# Patient Record
Sex: FEMALE | Race: WHITE | NOT HISPANIC OR LATINO | URBAN - METROPOLITAN AREA
[De-identification: names, ages, dates, MRNs, and addresses within clinical notes are randomized per-mention and may not be internally consistent; named-entity substitution may affect disease eponyms.]

---

## 2017-03-28 ENCOUNTER — CLINICAL SUPPORT (OUTPATIENT)
Dept: REHABILITATION | Facility: HOSPITAL | Age: 72
End: 2017-03-28
Attending: INTERNAL MEDICINE
Payer: COMMERCIAL

## 2017-03-28 DIAGNOSIS — M25.551 BILATERAL HIP PAIN: ICD-10-CM

## 2017-03-28 DIAGNOSIS — M79.671 INTRACTABLE RIGHT HEEL PAIN: Primary | ICD-10-CM

## 2017-03-28 DIAGNOSIS — M25.552 BILATERAL HIP PAIN: ICD-10-CM

## 2017-03-28 PROCEDURE — G8978 MOBILITY CURRENT STATUS: HCPCS | Mod: CL,PO

## 2017-03-28 PROCEDURE — 97162 PT EVAL MOD COMPLEX 30 MIN: CPT | Mod: PO

## 2017-03-28 PROCEDURE — G8979 MOBILITY GOAL STATUS: HCPCS | Mod: CK,PO

## 2017-03-28 NOTE — PROGRESS NOTES
Physical Therapy Evaluation    Visit: 1    Name: Keiry Rudolph  Clinic Number: [id number]    Keiry is a 71 y.o. female evaluated on 03/28/2017.     Diagnosis:   Encounter Diagnoses   Name Primary?    Intractable right heel pain Yes    Bilateral hip pain        DOS: NA    Physician: Josemanuel Duggan MD  Treatment Orders: PT Eval and Treat    No past medical history on file.  No current outpatient prescriptions on file.     No current facility-administered medications for this visit.      Review of patient's allergies indicates:  Allergies not on file  Precautions: Fall  Occupation: retired      Subjective  Onset/JIMMY: gradual  Involved Area/Location: bilateral  Current Symptoms: pain    Increases symptoms: Movement  Decreases Symptoms: Not moving    Current Function: Prolonged walking, standing and climbing stairs  Previous function: I in ADL's    Diagnostic Tests: MRI    Pain Scale: Keiry rates B leg pain to be between 4 - 8 on a scale of 1-10.    Patient Goals: decrease pain and Improve mobility and walk without pain      Objective    Functional Limitations  Reports - G Codes    Category:  Mobility   Tool:  FOTO Outcomes Measurement System.   Score:  Patient scored out 24 of 100 on the FOTO Outcomes Measurement System.   Current:  G 8978 CL   Goal:  G 8979 CK          Observation: Pt was not willing flex her L hip in supine    Posture: Flexed trunk in standing       Passive Range of Motion: Knee   Left Right   Flexion: NT NT   Extension 0 0            Lower Extremity Strength  Right LE  Left LE    Knee extension: 3+/5 Knee extension: 3/5   Knee flexion: 2/5 Knee flexion: 2/5   Hip flexion: 2/5 Hip flexion: 2/5   Hip extension:  NT Hip extension: NT   Hip abduction/Glut Medius: NT Hip abduction/Glut medius NT   Hip adduction: NT Hip adduction: NT   Hip internal rotation: NT Hip internal rotation: NT   Hip external rotation: NT Hip  external rotation: NT   Ankle dorsiflexion: 5/5 Ankle dorsiflexion: 4/5   Ankle plantarflexion: NT Ankle plantarflexion: NT       Special Tests: Knee - NA    Joint Mobility: WNL  Palpation: B hip Adductors and hip flexors  Sensation: intact to light touch    Edema: none      Gait: Keiry ambulated 100 feet with no assistive device.    Balance: NT    PT Evaluation Completed? Yes  Discussed Plan of Care with patient: Yes    Treatment:  Pt performed there ex's for B LE strengthening, ROM, flexibility and endurance including:  Quad sets  Gluteal sets  B Ankle Dorsiflexion, Plantar flexion, inversion and eversion    Exercises were reviewed and pt was able to demonstrate them prior to the end of the session.       Ed pt to use ice PRN 10- 20 min when pain or swelling occurs.     Keiry demo good understanding of the education provided. Patient demo good return demo of skill of exercises.      Assessment  This is a 71 y.o. female referred to outpatient physical therapy and presents with a medical diagnosis of   Encounter Diagnoses   Name Primary?    Intractable right heel pain Yes    Bilateral hip pain     and demonstrates limitations as described in the problem list. Pt will benefit from physcial therapy services in order to maximize pain free and/or functional use of bilateral knees and hips. The following goals were discussed with the patient and patient is in agreement with them as to be addressed in the treatment plan.     Problem List: pain, decreased strength, decreased balance and stability and decreased ability to fully participate in recreational/sports related activities.      GOALS: Short Term Goals:  3 weeks  1. Pt will demonstrate increased knee flexion AROM to 120 degrees.  2. Pt will demonstrate increased hip flexion AROM to 90 degrees  3. Independent with HEP to increase patient's tolerance for ADL  function    Long Term Goals: 6 weeks  1.Report decreased    B LE    pain  <   / =  2  /10 with walking to  increase tolerance for ADL's  2.Increased MMT  for  B LE's  to increase tolerance for ADL and work activities.  3.Increased arom  for  B hips and knees to improve normal movement patterns during ADL's.  4. Pt will report improvement in overall functional abilities of LE, evidenced by improved score on the FOTO knee survey    Plan  Pt will be treated by physical therapy 2 times a week for 6 weeks for Pt Education, HEP, therapeutic exercises, neuromuscular re-education/ balance exercises, joint mobilizations, modalities prn   to achieve established goals. Keiry may at times be seen by a PTA as part of the Rehab Team.     Cont PT for  6         weeks.   I certify the need for these services furnished under this plan of treatment and while under my care.______________________________ Physician/Referring Practitioner  Date of Signature

## 2017-03-30 ENCOUNTER — CLINICAL SUPPORT (OUTPATIENT)
Dept: REHABILITATION | Facility: HOSPITAL | Age: 72
End: 2017-03-30
Attending: INTERNAL MEDICINE
Payer: COMMERCIAL

## 2017-03-30 DIAGNOSIS — M25.552 BILATERAL HIP PAIN: Primary | ICD-10-CM

## 2017-03-30 DIAGNOSIS — M25.551 BILATERAL HIP PAIN: Primary | ICD-10-CM

## 2017-03-30 PROCEDURE — 97110 THERAPEUTIC EXERCISES: CPT | Mod: PO

## 2017-03-30 PROCEDURE — 97530 THERAPEUTIC ACTIVITIES: CPT | Mod: PO

## 2017-03-30 NOTE — PROGRESS NOTES
Physical Therapy Daily Note    Visit: 2    Name: Keiry Rudolph  Clinic Number: [id number]    Keiry is a 71 y.o. female treated on 03/30/2017.     Diagnosis:   Encounter Diagnosis   Name Primary?    Bilateral hip pain Yes       DOS: NA    Physician: Kirk, Provider  Treatment Orders: PT Eval and Treat    No past medical history on file.  No current outpatient prescriptions on file.     No current facility-administered medications for this visit.      Review of patient's allergies indicates:  Allergies not on file  Precautions: Fall  Occupation: retired      Subjective  Onset/JIMMY: gradual  Involved Area/Location: bilateral  Current Symptoms: pain    Increases symptoms: Movement  Decreases Symptoms: Not moving    Current Function: Prolonged walking, standing and climbing stairs  Previous function: I in ADL's    Diagnostic Tests: MRI    Pain Scale: Keiry rates B leg pain to be between 4 - 8 on a scale of 1-10.    Patient Goals: decrease pain and Improve mobility and walk without pain  Pt asked if she could use the Nustep today.  She stated that she has tried it at the gym with some difficulty.    Objective    Functional Limitations  Reports - G Codes    Category:  Mobility   Tool:  FOTO Outcomes Measurement System.   Score:  Patient scored out 24 of 100 on the FOTO Outcomes Measurement System.   Current:  G 8978 CL   Goal:  G 8979 CK          Observation: Pt was not willing flex her L hip in supine    Posture: Flexed trunk in standing       Passive Range of Motion: Knee   Left Right   Flexion: NT NT   Extension 0 0            Lower Extremity Strength  Right LE  Left LE    Knee extension: 3+/5 Knee extension: 3/5   Knee flexion: 2/5 Knee flexion: 2/5   Hip flexion: 2/5 Hip flexion: 2/5   Hip extension:  NT Hip extension: NT   Hip abduction/Glut Medius: NT Hip abduction/Glut medius NT   Hip adduction: NT Hip adduction: NT   Hip  internal rotation: NT Hip internal rotation: NT   Hip external rotation: NT Hip external rotation: NT   Ankle dorsiflexion: 5/5 Ankle dorsiflexion: 4/5   Ankle plantarflexion: NT Ankle plantarflexion: NT       Special Tests: Knee - NA    Joint Mobility: WNL  Palpation: B hip Adductors and hip flexors  Sensation: intact to light touch    Edema: none      Gait: Keiry ambulated 100 feet with no assistive device.    Balance: NT    PT Evaluation Completed? Yes  Discussed Plan of Care with patient: Yes    Treatment:  Pt performed there ex's for B LE strengthening, ROM, flexibility and endurance including:  Nu Step x 6 minutes - pt instructed in a new exercise  Heel slides 10 x 2 on the L and 10 x 3 on the R - with slide board and a towel set up for Pt.  B Quad sets 10 x 3 with 3 sec hold  Gluteal sets 10 x 3 with 3 sec holld  B Ankle Dorsiflexion, Plantar flexion, inversion and eversion 10 x 3 each    Exercises were reviewed and pt was able to demonstrate them prior to the end of the session.       Ed pt to use ice PRN 10- 20 min when pain or swelling occurs.     Keiry demo good understanding of the education provided. Patient demo good return demo of skill of exercises.      Assessment  This is a 71 y.o. female referred to outpatient physical therapy and presents with a medical diagnosis of   Encounter Diagnosis   Name Primary?    Bilateral hip pain Yes    and demonstrates limitations as described in the problem list. Pt will benefit from physcial therapy services in order to maximize pain free and/or functional use of bilateral knees and hips. The following goals were discussed with the patient and patient is in agreement with them as to be addressed in the treatment plan.  Pt was able to tolerate L hip flexion today.    Problem List: pain, decreased strength, decreased balance and stability and decreased ability to fully participate in recreational/sports related activities.      GOALS: Short Term Goals:  3  weeks  1. Pt will demonstrate increased knee flexion AROM to 120 degrees.  2. Pt will demonstrate increased hip flexion AROM to 90 degrees  3. Independent with HEP to increase patient's tolerance for ADL  function    Long Term Goals: 6 weeks  1.Report decreased    B LE    pain  <   / =  2  /10 with walking to increase tolerance for ADL's  2.Increased MMT  for  B LE's  to increase tolerance for ADL and work activities.  3.Increased arom  for  B hips and knees to improve normal movement patterns during ADL's.  4. Pt will report improvement in overall functional abilities of LE, evidenced by improved score on the FOTO knee survey    Plan  Pt will be treated by physical therapy 2 times a week for 6 weeks for Pt Education, HEP, therapeutic exercises, neuromuscular re-education/ balance exercises, joint mobilizations, modalities prn   to achieve established goals. Keiry may at times be seen by a PTA as part of the Rehab Team.     Cont PT for  6         weeks.   I certify the need for these services furnished under this plan of treatment and while under my care.______________________________ Physician/Referring Practitioner  Date of Signature

## 2017-04-04 ENCOUNTER — CLINICAL SUPPORT (OUTPATIENT)
Dept: REHABILITATION | Facility: HOSPITAL | Age: 72
End: 2017-04-04
Attending: INTERNAL MEDICINE
Payer: COMMERCIAL

## 2017-04-04 DIAGNOSIS — M25.551 BILATERAL HIP PAIN: Primary | ICD-10-CM

## 2017-04-04 DIAGNOSIS — M25.552 BILATERAL HIP PAIN: Primary | ICD-10-CM

## 2017-04-04 PROCEDURE — 97530 THERAPEUTIC ACTIVITIES: CPT | Mod: PO

## 2017-04-04 NOTE — PROGRESS NOTES
Physical Therapy Daily Note    Visit: 3    Name: Keiry Rudolph  Clinic Number: [id number]    Keiry is a 71 y.o. female treated on 04/04/2017.     Diagnosis:   Encounter Diagnosis   Name Primary?    Bilateral hip pain Yes       DOS: NA    Physician: Kirk, Provider  Treatment Orders: PT Eval and Treat    No past medical history on file.  No current outpatient prescriptions on file.     No current facility-administered medications for this visit.      Review of patient's allergies indicates:  Allergies not on file  Precautions: Fall  Occupation: retired      Subjective  Onset/JIMMY: gradual  Involved Area/Location: bilateral  Current Symptoms: pain    Increases symptoms: Movement  Decreases Symptoms: Not moving    Current Function: Prolonged walking, standing and climbing stairs  Previous function: I in ADL's    Diagnostic Tests: MRI    Pain Scale: Keiry rates B leg pain to be between 4 - 8 on a scale of 1-10.    Patient Goals: decrease pain and Improve mobility and walk without pain  Pt reported that she will be leaving town tomorrow, so she will not be able to come in again untill next week.    Objective    Functional Limitations  Reports - G Codes    Category:  Mobility   Tool:  FOTO Outcomes Measurement System.   Score:  Patient scored out 24 of 100 on the FOTO Outcomes Measurement System.   Current:  G 8978 CL   Goal:  G 8979 CK          Observation: Pt was not willing flex her L hip in supine    Posture: Flexed trunk in standing       Passive Range of Motion: Knee   Left Right   Flexion: NT NT   Extension 0 0            Lower Extremity Strength  Right LE  Left LE    Knee extension: 3+/5 Knee extension: 3/5   Knee flexion: 2/5 Knee flexion: 2/5   Hip flexion: 2/5 Hip flexion: 2/5   Hip extension:  NT Hip extension: NT   Hip abduction/Glut Medius: NT Hip abduction/Glut medius NT   Hip adduction: NT Hip adduction: NT   Hip  internal rotation: NT Hip internal rotation: NT   Hip external rotation: NT Hip external rotation: NT   Ankle dorsiflexion: 5/5 Ankle dorsiflexion: 4/5   Ankle plantarflexion: NT Ankle plantarflexion: NT       Special Tests: Knee - NA    Joint Mobility: WNL  Palpation: B hip Adductors and hip flexors  Sensation: intact to light touch    Edema: none      Gait: Keiry ambulated 100 feet with no assistive device.    Balance: NT    PT Evaluation Completed? Yes  Discussed Plan of Care with patient: Yes    Treatment:  Pt performed there ex's for B LE strengthening, ROM, flexibility and endurance including:  Nu Step x 10 minutes - pt instructed in a new exercise  Heel slides 10 x 0 on the L and 10 x 3 on the R - with slide board and a towel set up for Pt.  B Quad sets 10 x 3 with 3 sec hold  Gluteal sets 10 x 3 with 3 sec holld  B Ankle Dorsiflexion, Plantar flexion, inversion and eversion 10 x 3 each    Exercises were reviewed and pt was able to demonstrate them prior to the end of the session.       Ed pt to use ice PRN 10- 20 min when pain or swelling occurs.     Keiry demo good understanding of the education provided. Patient demo good return demo of skill of exercises.      Assessment  This is a 71 y.o. female referred to outpatient physical therapy and presents with a medical diagnosis of   Encounter Diagnosis   Name Primary?    Bilateral hip pain Yes    and demonstrates limitations as described in the problem list. Pt will benefit from physcial therapy services in order to maximize pain free and/or functional use of bilateral knees and hips. The following goals were discussed with the patient and patient is in agreement with them as to be addressed in the treatment plan.  Pt was not able to perform heel slides on the L today, but increased her time to 10 min on the Nustep    Problem List: pain, decreased strength, decreased balance and stability and decreased ability to fully participate in recreational/sports  related activities.      GOALS: Short Term Goals:  3 weeks  1. Pt will demonstrate increased knee flexion AROM to 120 degrees.  2. Pt will demonstrate increased hip flexion AROM to 90 degrees  3. Independent with HEP to increase patient's tolerance for ADL  function    Long Term Goals: 6 weeks  1.Report decreased    B LE    pain  <   / =  2  /10 with walking to increase tolerance for ADL's  2.Increased MMT  for  B LE's  to increase tolerance for ADL and work activities.  3.Increased arom  for  B hips and knees to improve normal movement patterns during ADL's.  4. Pt will report improvement in overall functional abilities of LE, evidenced by improved score on the FOTO knee survey    Plan  Pt will be treated by physical therapy 2 times a week for 6 weeks for Pt Education, HEP, therapeutic exercises, neuromuscular re-education/ balance exercises, joint mobilizations, modalities prn   to achieve established goals. Keiry may at times be seen by a PTA as part of the Rehab Team.     Cont PT for  6         weeks.   I certify the need for these services furnished under this plan of treatment and while under my care.______________________________ Physician/Referring Practitioner  Date of Signature

## 2017-04-12 ENCOUNTER — CLINICAL SUPPORT (OUTPATIENT)
Dept: REHABILITATION | Facility: HOSPITAL | Age: 72
End: 2017-04-12
Attending: INTERNAL MEDICINE
Payer: COMMERCIAL

## 2017-04-12 DIAGNOSIS — M25.551 BILATERAL HIP PAIN: Primary | ICD-10-CM

## 2017-04-12 DIAGNOSIS — M25.552 BILATERAL HIP PAIN: Primary | ICD-10-CM

## 2017-04-12 PROCEDURE — 97110 THERAPEUTIC EXERCISES: CPT | Mod: PO

## 2017-04-12 NOTE — PROGRESS NOTES
Physical Therapy Daily Note    Visit: 4    Name: Keiry Rudolph  Clinic Number: [id number]    Keiry is a 71 y.o. female treated on 04/12/2017.     Diagnosis:   Encounter Diagnosis   Name Primary?    Bilateral hip pain Yes       DOS: NA    Physician: Josemanuel Duggan MD  Treatment Orders: PT Eval and Treat    No past medical history on file.  No current outpatient prescriptions on file.     No current facility-administered medications for this visit.      Review of patient's allergies indicates:  Allergies not on file  Precautions: Fall  Occupation: retired      Subjective  Onset/JIMMY: gradual  Involved Area/Location: bilateral  Current Symptoms: pain    Increases symptoms: Movement  Decreases Symptoms: Not moving    Current Function: Prolonged walking, standing and climbing stairs  Previous function: I in ADL's    Diagnostic Tests: MRI    Pain Scale: Keiry rates B leg pain to be between 4 - 8 on a scale of 1-10.    Patient Goals: decrease pain and Improve mobility and walk without pain  Pt with no new c/o    Objective    Functional Limitations  Reports - G Codes    Category:  Mobility   Tool:  FOTO Outcomes Measurement System.   Score:  Patient scored out 24 of 100 on the FOTO Outcomes Measurement System.   Current:  G 8978 CL   Goal:  G 8979 CK          Observation: Pt was not willing flex her L hip in supine    Posture: Flexed trunk in standing       Passive Range of Motion: Knee   Left Right   Flexion: NT NT   Extension 0 0            Lower Extremity Strength  Right LE  Left LE    Knee extension: 3+/5 Knee extension: 3/5   Knee flexion: 2/5 Knee flexion: 2/5   Hip flexion: 2/5 Hip flexion: 2/5   Hip extension:  NT Hip extension: NT   Hip abduction/Glut Medius: NT Hip abduction/Glut medius NT   Hip adduction: NT Hip adduction: NT   Hip internal rotation: NT Hip internal rotation: NT   Hip external rotation: NT Hip external  rotation: NT   Ankle dorsiflexion: 5/5 Ankle dorsiflexion: 4/5   Ankle plantarflexion: NT Ankle plantarflexion: NT       Special Tests: Knee - NA    Joint Mobility: WNL  Palpation: B hip Adductors and hip flexors  Sensation: intact to light touch    Edema: none      Gait: Keiry ambulated 100 feet with no assistive device.    Balance: NT    PT Evaluation Completed? Yes  Discussed Plan of Care with patient: Yes    Treatment:  Pt performed there ex's for B LE strengthening, ROM, flexibility and endurance including:  Nu Step x 10 minutes - pt instructed in a new exercise  Heel slides 10 x 0 on the L and 10 x 3 on the R - with slide board and a towel set up for Pt.  B Quad sets 10 x 3 with 3 sec hold  Gluteal sets 10 x 3 with 3 sec holld  B Ankle Dorsiflexion, Plantar flexion, inversion and eversion 10 x 3 each  Rolling R & L quadriceps    Exercises were reviewed and pt was able to demonstrate them prior to the end of the session.       Ed pt to use ice PRN 10- 20 min when pain or swelling occurs.     Keiry reyeso good understanding of the education provided. Patient demo good return demo of skill of exercises.      Assessment  This is a 71 y.o. female referred to outpatient physical therapy and presents with a medical diagnosis of   Encounter Diagnosis   Name Primary?    Bilateral hip pain Yes    and demonstrates limitations as described in the problem list. Pt will benefit from physcial therapy services in order to maximize pain free and/or functional use of bilateral knees and hips. The following goals were discussed with the patient and patient is in agreement with them as to be addressed in the treatment plan.  Pt was able to perform heel slides on the L today.    Problem List: pain, decreased strength, decreased balance and stability and decreased ability to fully participate in recreational/sports related activities.      GOALS: Short Term Goals:  3 weeks  1. Pt will demonstrate increased knee flexion AROM to 120  degrees.  2. Pt will demonstrate increased hip flexion AROM to 90 degrees  3. Independent with HEP to increase patient's tolerance for ADL  function    Long Term Goals: 6 weeks  1.Report decreased    B LE    pain  <   / =  2  /10 with walking to increase tolerance for ADL's  2.Increased MMT  for  B LE's  to increase tolerance for ADL and work activities.  3.Increased arom  for  B hips and knees to improve normal movement patterns during ADL's.  4. Pt will report improvement in overall functional abilities of LE, evidenced by improved score on the FOTO knee survey    Plan  Pt will be treated by physical therapy 2 times a week for 6 weeks for Pt Education, HEP, therapeutic exercises, neuromuscular re-education/ balance exercises, joint mobilizations, modalities prn   to achieve established goals. Keiry may at times be seen by a PTA as part of the Rehab Team.     Cont PT for  6         weeks.   I certify the need for these services furnished under this plan of treatment and while under my care.______________________________ Physician/Referring Practitioner  Date of Signature

## 2017-04-20 ENCOUNTER — CLINICAL SUPPORT (OUTPATIENT)
Dept: REHABILITATION | Facility: HOSPITAL | Age: 72
End: 2017-04-20
Attending: INTERNAL MEDICINE
Payer: COMMERCIAL

## 2017-04-20 DIAGNOSIS — M25.551 BILATERAL HIP PAIN: Primary | ICD-10-CM

## 2017-04-20 DIAGNOSIS — M25.552 BILATERAL HIP PAIN: Primary | ICD-10-CM

## 2017-04-20 PROCEDURE — 97110 THERAPEUTIC EXERCISES: CPT | Mod: PO

## 2017-04-20 NOTE — PROGRESS NOTES
Physical Therapy Daily Note    Visit: 5    Name: Keiry Rudolph  Clinic Number: [id number]    Keiry is a 71 y.o. female treated on 04/20/2017.     Diagnosis:   Encounter Diagnosis   Name Primary?    Bilateral hip pain Yes       DOS: NA    Physician: Kirk, Provider  Treatment Orders: PT Eval and Treat    No past medical history on file.  No current outpatient prescriptions on file.     No current facility-administered medications for this visit.      Review of patient's allergies indicates:  Allergies not on file  Precautions: Fall  Occupation: retired      Subjective  Onset/JIMMY: gradual  Involved Area/Location: bilateral  Current Symptoms: pain    Increases symptoms: Movement  Decreases Symptoms: Not moving    Current Function: Prolonged walking, standing and climbing stairs  Previous function: I in ADL's    Diagnostic Tests: MRI    Pain Scale: Keiry rates B leg pain to be between 4 - 8 on a scale of 1-10.    Patient Goals: decrease pain and Improve mobility and walk without pain  Pt reported that she got home late from out of town and missed her last appointment.      Objective    Functional Limitations  Reports - G Codes    Category:  Mobility   Tool:  FOTO Outcomes Measurement System.   Score:  Patient scored out 24 of 100 on the FOTO Outcomes Measurement System.   Current:  G 8978 CL   Goal:  G 8979 CK          Observation: Pt was not willing flex her L hip in supine    Posture: Flexed trunk in standing       Passive Range of Motion: Knee   Left Right   Flexion: NT NT   Extension 0 0            Lower Extremity Strength  Right LE  Left LE    Knee extension: 3+/5 Knee extension: 3/5   Knee flexion: 2/5 Knee flexion: 2/5   Hip flexion: 2/5 Hip flexion: 2/5   Hip extension:  NT Hip extension: NT   Hip abduction/Glut Medius: NT Hip abduction/Glut medius NT   Hip adduction: NT Hip adduction: NT   Hip internal rotation: NT Hip  internal rotation: NT   Hip external rotation: NT Hip external rotation: NT   Ankle dorsiflexion: 5/5 Ankle dorsiflexion: 4/5   Ankle plantarflexion: NT Ankle plantarflexion: NT       Special Tests: Knee - NA    Joint Mobility: WNL  Palpation: B hip Adductors and hip flexors  Sensation: intact to light touch    Edema: none      Gait: Keiry ambulated 100 feet with no assistive device.    Balance: NT    PT Evaluation Completed? Yes  Discussed Plan of Care with patient: Yes    Treatment:  Pt performed there ex's for B LE strengthening, ROM, flexibility and endurance including:  Nu Step x 10 minutes -   Heel slides 10 x 0 on the L and 10 x 3 on the R - with slide board and a towel set up for Pt.  B Quad sets 10 x 3 with 3 sec hold  Gluteal sets 10 x 3 with 3 sec holld  B Ankle Dorsiflexion, Plantar flexion, inversion and eversion 10 x 3 each  Rolling R & L quadriceps and glut med.  Clams 10 x 3 on R & L    Exercises were reviewed and pt was able to demonstrate them prior to the end of the session.       Ed pt to use ice PRN 10- 20 min when pain or swelling occurs.     Keiry demo good understanding of the education provided. Patient demo good return demo of skill of exercises.      Assessment  This is a 71 y.o. female referred to outpatient physical therapy and presents with a medical diagnosis of   Encounter Diagnosis   Name Primary?    Bilateral hip pain Yes    and demonstrates limitations as described in the problem list. Pt will benefit from physcial therapy services in order to maximize pain free and/or functional use of bilateral knees and hips. The following goals were discussed with the patient and patient is in agreement with them as to be addressed in the treatment plan.  Pt was able to perform clams, but has difficulty flexing her L hip in supine.    Problem List: pain, decreased strength, decreased balance and stability and decreased ability to fully participate in recreational/sports related  activities.      GOALS: Short Term Goals:  3 weeks  1. Pt will demonstrate increased knee flexion AROM to 120 degrees.  2. Pt will demonstrate increased hip flexion AROM to 90 degrees  3. Independent with HEP to increase patient's tolerance for ADL  function    Long Term Goals: 6 weeks  1.Report decreased    B LE    pain  <   / =  2  /10 with walking to increase tolerance for ADL's  2.Increased MMT  for  B LE's  to increase tolerance for ADL and work activities.  3.Increased arom  for  B hips and knees to improve normal movement patterns during ADL's.  4. Pt will report improvement in overall functional abilities of LE, evidenced by improved score on the FOTO knee survey    Plan  Pt will be treated by physical therapy 2 times a week for 6 weeks for Pt Education, HEP, therapeutic exercises, neuromuscular re-education/ balance exercises, joint mobilizations, modalities prn   to achieve established goals. Keiry may at times be seen by a PTA as part of the Rehab Team.     Cont PT for  6         weeks.   I certify the need for these services furnished under this plan of treatment and while under my care.______________________________ Physician/Referring Practitioner  Date of Signature

## 2017-04-27 ENCOUNTER — CLINICAL SUPPORT (OUTPATIENT)
Dept: REHABILITATION | Facility: HOSPITAL | Age: 72
End: 2017-04-27
Attending: INTERNAL MEDICINE
Payer: COMMERCIAL

## 2017-04-27 DIAGNOSIS — M25.551 BILATERAL HIP PAIN: Primary | ICD-10-CM

## 2017-04-27 DIAGNOSIS — M25.552 BILATERAL HIP PAIN: Primary | ICD-10-CM

## 2017-04-27 PROCEDURE — 97110 THERAPEUTIC EXERCISES: CPT | Mod: PO

## 2017-04-28 ENCOUNTER — CLINICAL SUPPORT (OUTPATIENT)
Dept: REHABILITATION | Facility: HOSPITAL | Age: 72
End: 2017-04-28
Attending: INTERNAL MEDICINE
Payer: COMMERCIAL

## 2017-04-28 DIAGNOSIS — M25.551 BILATERAL HIP PAIN: Primary | ICD-10-CM

## 2017-04-28 DIAGNOSIS — M25.552 BILATERAL HIP PAIN: Primary | ICD-10-CM

## 2017-04-28 PROCEDURE — 97110 THERAPEUTIC EXERCISES: CPT | Mod: PO

## 2017-04-28 NOTE — PROGRESS NOTES
Physical Therapy Daily Note    Visit: 6    Name: Keiry Rudolph  Clinic Number: [id number]    Keiry is a 71 y.o. female treated on 04/28/2017.     Diagnosis:   Encounter Diagnosis   Name Primary?    Bilateral hip pain Yes       DOS: NA    Physician: Kirk, Provider  Treatment Orders: PT Eval and Treat    No past medical history on file.  No current outpatient prescriptions on file.     No current facility-administered medications for this visit.      Review of patient's allergies indicates:  Allergies not on file  Precautions: Fall  Occupation: retired      Subjective  Onset/JIMMY: gradual  Involved Area/Location: bilateral  Current Symptoms: pain    Increases symptoms: Movement  Decreases Symptoms: Not moving    Current Function: Prolonged walking, standing and climbing stairs  Previous function: I in ADL's    Diagnostic Tests: MRI    Pain Scale: Keiry rates B leg pain to be between 4 - 8 on a scale of 1-10.    Patient Goals: decrease pain and Improve mobility and walk without pain  Pt reported that she is doing well today.     Objective    Functional Limitations  Reports - G Codes    Category:  Mobility   Tool:  FOTO Outcomes Measurement System.   Score:  Patient scored out 24 of 100 on the FOTO Outcomes Measurement System.   Current:  G 8978 CL   Goal:  G 8979 CK          Observation: Pt was not willing flex her L hip in supine    Posture: Flexed trunk in standing       Passive Range of Motion: Knee   Left Right   Flexion: NT NT   Extension 0 0            Lower Extremity Strength  Right LE  Left LE    Knee extension: 3+/5 Knee extension: 3/5   Knee flexion: 2/5 Knee flexion: 2/5   Hip flexion: 2/5 Hip flexion: 2/5   Hip extension:  NT Hip extension: NT   Hip abduction/Glut Medius: NT Hip abduction/Glut medius NT   Hip adduction: NT Hip adduction: NT   Hip internal rotation: NT Hip internal rotation: NT   Hip external rotation:  NT Hip external rotation: NT   Ankle dorsiflexion: 5/5 Ankle dorsiflexion: 4/5   Ankle plantarflexion: NT Ankle plantarflexion: NT       Special Tests: Knee - NA    Joint Mobility: WNL  Palpation: B hip Adductors and hip flexors  Sensation: intact to light touch    Edema: none      Gait: Keiry ambulated 100 feet with no assistive device.    Balance: NT    PT Evaluation Completed? Yes  Discussed Plan of Care with patient: Yes    Treatment:  Pt performed there ex's for B LE strengthening, ROM, flexibility and endurance including:  Nu Step x 10 minutes -   Heel slides 10 x 0 on the L and 10 x 3 on the R - with slide board and a towel set up for Pt.  B Quad sets 10 x 3 with 3 sec hold  Gluteal sets 10 x 3 with 3 sec holld  B Ankle Dorsiflexion, Plantar flexion, inversion and eversion 10 x 3 each  Rolling R & L quadriceps and glut med.  Clams 10 x 3 on R & L  Supine B hip rotation 10 x 3  Exercises were reviewed and pt was able to demonstrate them prior to the end of the session.       Ed pt to use ice PRN 10- 20 min when pain or swelling occurs.     Keiry demo good understanding of the education provided. Patient demo good return demo of skill of exercises.      Assessment  This is a 71 y.o. female referred to outpatient physical therapy and presents with a medical diagnosis of   Encounter Diagnosis   Name Primary?    Bilateral hip pain Yes    and demonstrates limitations as described in the problem list. Pt will benefit from physcial therapy services in order to maximize pain free and/or functional use of bilateral knees and hips. The following goals were discussed with the patient and patient is in agreement with them as to be addressed in the treatment plan.  Pt tolerated exercises well today.    Problem List: pain, decreased strength, decreased balance and stability and decreased ability to fully participate in recreational/sports related activities.      GOALS: Short Term Goals:  3 weeks  1. Pt will demonstrate  increased knee flexion AROM to 120 degrees.  2. Pt will demonstrate increased hip flexion AROM to 90 degrees  3. Independent with HEP to increase patient's tolerance for ADL  function    Long Term Goals: 6 weeks  1.Report decreased    B LE    pain  <   / =  2  /10 with walking to increase tolerance for ADL's  2.Increased MMT  for  B LE's  to increase tolerance for ADL and work activities.  3.Increased arom  for  B hips and knees to improve normal movement patterns during ADL's.  4. Pt will report improvement in overall functional abilities of LE, evidenced by improved score on the FOTO knee survey    Plan  Pt will be treated by physical therapy 2 times a week for 6 weeks for Pt Education, HEP, therapeutic exercises, neuromuscular re-education/ balance exercises, joint mobilizations, modalities prn   to achieve established goals. Keiry may at times be seen by a PTA as part of the Rehab Team.     Cont PT for  6         weeks.   I certify the need for these services furnished under this plan of treatment and while under my care.______________________________ Physician/Referring Practitioner  Date of Signature

## 2017-04-28 NOTE — PROGRESS NOTES
Physical Therapy Daily Note    Visit: 6    Name: Keiry Rudolph  Clinic Number: [id number]    Keiry is a 71 y.o. female treated on 04/28/2017.     Diagnosis:   Encounter Diagnosis   Name Primary?    Bilateral hip pain Yes       DOS: NA    Physician: Josemanuel Duggan MD  Treatment Orders: PT Eval and Treat    No past medical history on file.  No current outpatient prescriptions on file.     No current facility-administered medications for this visit.      Review of patient's allergies indicates:  Allergies not on file  Precautions: Fall  Occupation: retired      Subjective  Onset/JIMMY: gradual  Involved Area/Location: bilateral  Current Symptoms: pain    Increases symptoms: Movement  Decreases Symptoms: Not moving    Current Function: Prolonged walking, standing and climbing stairs  Previous function: I in ADL's    Diagnostic Tests: MRI    Pain Scale: Keiry rates B leg pain to be between 4 - 8 on a scale of 1-10.    Patient Goals: decrease pain and Improve mobility and walk without pain  Pt reported that she got home late from out of town and missed her last appointment.      Objective    Functional Limitations  Reports - G Codes    Category:  Mobility   Tool:  FOTO Outcomes Measurement System.   Score:  Patient scored out 24 of 100 on the FOTO Outcomes Measurement System.   Current:  G 8978 CL   Goal:  G 8979 CK          Observation: Pt was not willing flex her L hip in supine    Posture: Flexed trunk in standing       Passive Range of Motion: Knee   Left Right   Flexion: NT NT   Extension 0 0            Lower Extremity Strength  Right LE  Left LE    Knee extension: 3+/5 Knee extension: 3/5   Knee flexion: 2/5 Knee flexion: 2/5   Hip flexion: 2/5 Hip flexion: 2/5   Hip extension:  NT Hip extension: NT   Hip abduction/Glut Medius: NT Hip abduction/Glut medius NT   Hip adduction: NT Hip adduction: NT   Hip internal rotation: NT Hip  internal rotation: NT   Hip external rotation: NT Hip external rotation: NT   Ankle dorsiflexion: 5/5 Ankle dorsiflexion: 4/5   Ankle plantarflexion: NT Ankle plantarflexion: NT       Special Tests: Knee - NA    Joint Mobility: WNL  Palpation: B hip Adductors and hip flexors  Sensation: intact to light touch    Edema: none      Gait: Keiry ambulated 100 feet with no assistive device.    Balance: NT    PT Evaluation Completed? Yes  Discussed Plan of Care with patient: Yes    Treatment:  Pt performed there ex's for B LE strengthening, ROM, flexibility and endurance including:  Nu Step x 10 minutes -   Hook lying B hip add with a ball 10 x 3  Hook lying B hip abd 10 x 3 with orange TB  B Quad sets 10 x 3 with 3 sec hold  Gluteal sets 10 x 3 with 3 sec holld  B Ankle Dorsiflexion, Plantar flexion, inversion and eversion 10 x 3 each  Rolling R & L quadriceps and glut med.  Clams 10 x 3 on R & L    Exercises were reviewed and pt was able to demonstrate them prior to the end of the session.       Ed pt to use ice PRN 10- 20 min when pain or swelling occurs.     Keiry demo good understanding of the education provided. Patient demo good return demo of skill of exercises.      Assessment  This is a 71 y.o. female referred to outpatient physical therapy and presents with a medical diagnosis of   Encounter Diagnosis   Name Primary?    Bilateral hip pain Yes    and demonstrates limitations as described in the problem list. Pt will benefit from physcial therapy services in order to maximize pain free and/or functional use of bilateral knees and hips. The following goals were discussed with the patient and patient is in agreement with them as to be addressed in the treatment plan.  Pt tolerated new exercise well.    Problem List: pain, decreased strength, decreased balance and stability and decreased ability to fully participate in recreational/sports related activities.      GOALS: Short Term Goals:  3 weeks  1. Pt will  demonstrate increased knee flexion AROM to 120 degrees.  2. Pt will demonstrate increased hip flexion AROM to 90 degrees  3. Independent with HEP to increase patient's tolerance for ADL  function    Long Term Goals: 6 weeks  1.Report decreased    B LE    pain  <   / =  2  /10 with walking to increase tolerance for ADL's  2.Increased MMT  for  B LE's  to increase tolerance for ADL and work activities.  3.Increased arom  for  B hips and knees to improve normal movement patterns during ADL's.  4. Pt will report improvement in overall functional abilities of LE, evidenced by improved score on the FOTO knee survey    Plan  Pt will be treated by physical therapy 2 times a week for 6 weeks for Pt Education, HEP, therapeutic exercises, neuromuscular re-education/ balance exercises, joint mobilizations, modalities prn   to achieve established goals. Keiry may at times be seen by a PTA as part of the Rehab Team.     Cont PT for  6         weeks.   I certify the need for these services furnished under this plan of treatment and while under my care.______________________________ Physician/Referring Practitioner  Date of Signature

## 2017-05-02 ENCOUNTER — CLINICAL SUPPORT (OUTPATIENT)
Dept: REHABILITATION | Facility: HOSPITAL | Age: 72
End: 2017-05-02
Attending: INTERNAL MEDICINE
Payer: COMMERCIAL

## 2017-05-02 DIAGNOSIS — M25.552 BILATERAL HIP PAIN: ICD-10-CM

## 2017-05-02 DIAGNOSIS — M25.551 BILATERAL HIP PAIN: ICD-10-CM

## 2017-05-02 DIAGNOSIS — M79.671 INTRACTABLE RIGHT HEEL PAIN: Primary | ICD-10-CM

## 2017-05-02 PROCEDURE — 97110 THERAPEUTIC EXERCISES: CPT | Mod: PO

## 2017-05-02 NOTE — PROGRESS NOTES
Physical Therapy Daily Note    Visit: 6    Name: Keiry Rudolph  Clinic Number: [id number]    Keiry is a 71 y.o. female treated on 05/02/2017.     Diagnosis:   Encounter Diagnoses   Name Primary?    Intractable right heel pain Yes    Bilateral hip pain        DOS: NA    Physician: Kirk, Provider  Treatment Orders: PT Eval and Treat    No past medical history on file.  No current outpatient prescriptions on file.     No current facility-administered medications for this visit.      Review of patient's allergies indicates:  Allergies not on file  Precautions: Fall  Occupation: retired      Subjective  Onset/JIMMY: gradual  Involved Area/Location: bilateral  Current Symptoms: pain    Increases symptoms: Movement  Decreases Symptoms: Not moving    Current Function: Prolonged walking, standing and climbing stairs  Previous function: I in ADL's    Diagnostic Tests: MRI    Pain Scale: Keiry rates B leg pain to be between 4 - 8 on a scale of 1-10.    Patient Goals: decrease pain and Improve mobility and walk without pain  Pt with no new complaints of pain in her legs today    Objective    Functional Limitations  Reports - G Codes    Category:  Mobility   Tool:  FOTO Outcomes Measurement System.   Score:  Patient scored out 24 of 100 on the FOTO Outcomes Measurement System.   Current:  G 8978 CL   Goal:  G 8979 CK          Observation: Pt was not willing flex her L hip in supine    Posture: Flexed trunk in standing       Passive Range of Motion: Knee   Left Right   Flexion: NT NT   Extension 0 0            Lower Extremity Strength  Right LE  Left LE    Knee extension: 3+/5 Knee extension: 3/5   Knee flexion: 2/5 Knee flexion: 2/5   Hip flexion: 2/5 Hip flexion: 2/5   Hip extension:  NT Hip extension: NT   Hip abduction/Glut Medius: NT Hip abduction/Glut medius NT   Hip adduction: NT Hip adduction: NT   Hip internal rotation: NT Hip  internal rotation: NT   Hip external rotation: NT Hip external rotation: NT   Ankle dorsiflexion: 5/5 Ankle dorsiflexion: 4/5   Ankle plantarflexion: NT Ankle plantarflexion: NT       Special Tests: Knee - NA    Joint Mobility: WNL  Palpation: B hip Adductors and hip flexors  Sensation: intact to light touch    Edema: none      Gait: Keiry ambulated 100 feet with no assistive device.    Balance: NT    PT Evaluation Completed? Yes  Discussed Plan of Care with patient: Yes    Treatment:  Pt performed there ex's for B LE strengthening, ROM, flexibility and endurance including:  Nu Step x 10 minutes -   UBE x 8 minutes  B Quad sets 10 x 3 with 3 sec hold  Gluteal sets 10 x 3 with 3 sec holld  B Ankle Dorsiflexion, Plantar flexion, inversion and eversion 10 x 3 each  Rolling R & L quadriceps and glut med.  Clams 10 x 3 on R & L  Supine B hip rotation 10 x 3  Exercises were reviewed and pt was able to demonstrate them prior to the end of the session.      Treatments not performed today:  Heel slides 10 x 0 on the L and 10 x 3 on the R - with slide board and a towel set up for Pt.  Ed pt to use ice PRN 10- 20 min when pain or swelling occurs.     Keiry demo good understanding of the education provided. Patient demo good return demo of skill of exercises.      Assessment  This is a 71 y.o. female referred to outpatient physical therapy and presents with a medical diagnosis of   Encounter Diagnoses   Name Primary?    Intractable right heel pain Yes    Bilateral hip pain     and demonstrates limitations as described in the problem list. Pt will benefit from physcial therapy services in order to maximize pain free and/or functional use of bilateral knees and hips. The following goals were discussed with the patient and patient is in agreement with them as to be addressed in the treatment plan.  Pt tolerated exercises well today.    Problem List: pain, decreased strength, decreased balance and stability and decreased  ability to fully participate in recreational/sports related activities.      GOALS: Short Term Goals:  3 weeks  1. Pt will demonstrate increased knee flexion AROM to 120 degrees.  2. Pt will demonstrate increased hip flexion AROM to 90 degrees  3. Independent with HEP to increase patient's tolerance for ADL  function    Long Term Goals: 6 weeks  1.Report decreased    B LE    pain  <   / =  2  /10 with walking to increase tolerance for ADL's  2.Increased MMT  for  B LE's  to increase tolerance for ADL and work activities.  3.Increased arom  for  B hips and knees to improve normal movement patterns during ADL's.  4. Pt will report improvement in overall functional abilities of LE, evidenced by improved score on the FOTO knee survey    Plan  Pt will be treated by physical therapy 2 times a week for 6 weeks for Pt Education, HEP, therapeutic exercises, neuromuscular re-education/ balance exercises, joint mobilizations, modalities prn   to achieve established goals. Keiry may at times be seen by a PTA as part of the Rehab Team.     Cont PT for  6         weeks.

## 2017-05-04 ENCOUNTER — CLINICAL SUPPORT (OUTPATIENT)
Dept: REHABILITATION | Facility: HOSPITAL | Age: 72
End: 2017-05-04
Attending: INTERNAL MEDICINE
Payer: COMMERCIAL

## 2017-05-04 DIAGNOSIS — M25.551 BILATERAL HIP PAIN: Primary | ICD-10-CM

## 2017-05-04 DIAGNOSIS — M25.552 BILATERAL HIP PAIN: Primary | ICD-10-CM

## 2017-05-04 PROCEDURE — 97530 THERAPEUTIC ACTIVITIES: CPT | Mod: PO

## 2017-05-04 PROCEDURE — 97110 THERAPEUTIC EXERCISES: CPT | Mod: PO

## 2017-05-04 NOTE — PROGRESS NOTES
"                                                                            Physical Therapy Daily Note    Visit: 6    Name: Keiry Rudolph  Clinic Number: [id number]    Keiry is a 71 y.o. female treated on 05/04/2017.     Diagnosis:   Encounter Diagnosis   Name Primary?    Bilateral hip pain Yes       DOS: NA    Physician: Josemanuel Duggan MD  Treatment Orders: PT Eval and Treat    No past medical history on file.  No current outpatient prescriptions on file.     No current facility-administered medications for this visit.      Review of patient's allergies indicates:  Allergies not on file  Precautions: Fall  Occupation: retired      Subjective  Onset/JIMMY: gradual  Involved Area/Location: bilateral  Current Symptoms: pain    Increases symptoms: Movement  Decreases Symptoms: Not moving    Current Function: Prolonged walking, standing and climbing stairs  Previous function: I in ADL's    Diagnostic Tests: MRI    Pain Scale: Keiry rates B leg pain to be between 4 - 8 on a scale of 1-10.    Patient Goals: decrease pain and Improve mobility and walk without pain  Pt reported that her lateral gluteal region is sore today and that she would like to concentrate "rolling" on this area.    Objective    Functional Limitations  Reports - G Codes    Category:  Mobility   Tool:  FOTO Outcomes Measurement System.   Score:  Patient scored out 24 of 100 on the FOTO Outcomes Measurement System.   Current:  G 8978 CL   Goal:  G 8979 CK          Observation: Pt was not willing flex her L hip in supine    Posture: Flexed trunk in standing       Passive Range of Motion: Knee   Left Right   Flexion: NT NT   Extension 0 0            Lower Extremity Strength  Right LE  Left LE    Knee extension: 3+/5 Knee extension: 3/5   Knee flexion: 2/5 Knee flexion: 2/5   Hip flexion: 2/5 Hip flexion: 2/5   Hip extension:  NT Hip extension: NT   Hip abduction/Glut Medius: NT Hip abduction/Glut medius NT   Hip adduction: NT Hip adduction: NT "   Hip internal rotation: NT Hip internal rotation: NT   Hip external rotation: NT Hip external rotation: NT   Ankle dorsiflexion: 5/5 Ankle dorsiflexion: 4/5   Ankle plantarflexion: NT Ankle plantarflexion: NT       Special Tests: Knee - NA    Joint Mobility: WNL  Palpation: B hip Adductors and hip flexors  Sensation: intact to light touch    Edema: none      Gait: Keiry ambulated 100 feet with no assistive device.    Balance: NT    PT Evaluation Completed? Yes  Discussed Plan of Care with patient: Yes    Treatment:  Pt performed there ex's for B LE strengthening, ROM, flexibility and endurance including:  Nu Step x 10 minutes - set up for Pt  UBE x 8 minutes  B Quad sets 10 x 3 with 3 sec hold  Gluteal sets 10 x 3 with 3 sec holld  B Ankle Dorsiflexion, Plantar flexion, inversion and eversion 10 x 3 each  Rolling R & L quadriceps and glut med.  Clams 10 x 3 on R & L  Supine B hip rotation 10 x 3  SAQ 10 x 3 on R & L - pt instructed in a new exercise  Exercises were reviewed and pt was able to demonstrate them prior to the end of the session.      Treatments not performed today:  Heel slides 10 x 0 on the L and 10 x 3 on the R - with slide board and a towel set up for Pt.  Ed pt to use ice PRN 10- 20 min when pain or swelling occurs.     Keiry demo good understanding of the education provided. Patient demo good return demo of skill of exercises.      Assessment  This is a 71 y.o. female referred to outpatient physical therapy and presents with a medical diagnosis of   Encounter Diagnosis   Name Primary?    Bilateral hip pain Yes    and demonstrates limitations as described in the problem list. Pt will benefit from physcial therapy services in order to maximize pain free and/or functional use of bilateral knees and hips. The following goals were discussed with the patient and patient is in agreement with them as to be addressed in the treatment plan.  Pt tolerated exercises well today.    Problem List: pain,  decreased strength, decreased balance and stability and decreased ability to fully participate in recreational/sports related activities.      GOALS: Short Term Goals:  3 weeks  1. Pt will demonstrate increased knee flexion AROM to 120 degrees.  2. Pt will demonstrate increased hip flexion AROM to 90 degrees  3. Independent with HEP to increase patient's tolerance for ADL  function    Long Term Goals: 6 weeks  1.Report decreased    B LE    pain  <   / =  2  /10 with walking to increase tolerance for ADL's  2.Increased MMT  for  B LE's  to increase tolerance for ADL and work activities.  3.Increased arom  for  B hips and knees to improve normal movement patterns during ADL's.  4. Pt will report improvement in overall functional abilities of LE, evidenced by improved score on the FOTO knee survey    Plan  Pt will be treated by physical therapy 2 times a week for 6 weeks for Pt Education, HEP, therapeutic exercises, neuromuscular re-education/ balance exercises, joint mobilizations, modalities prn   to achieve established goals. Keiry may at times be seen by a PTA as part of the Rehab Team.     Cont PT for  6         weeks.

## 2017-05-11 ENCOUNTER — CLINICAL SUPPORT (OUTPATIENT)
Dept: REHABILITATION | Facility: HOSPITAL | Age: 72
End: 2017-05-11
Attending: INTERNAL MEDICINE
Payer: COMMERCIAL

## 2017-05-11 DIAGNOSIS — M25.551 BILATERAL HIP PAIN: Primary | ICD-10-CM

## 2017-05-11 DIAGNOSIS — M25.552 BILATERAL HIP PAIN: Primary | ICD-10-CM

## 2017-05-11 PROCEDURE — 97110 THERAPEUTIC EXERCISES: CPT | Mod: PO

## 2017-05-11 PROCEDURE — 97140 MANUAL THERAPY 1/> REGIONS: CPT | Mod: PO

## 2017-05-11 NOTE — PROGRESS NOTES
Physical Therapy Daily Note    Visit: 6    Name: Keiry Rudolph  Clinic Number: [id number]    Keiry is a 71 y.o. female treated on 05/11/2017.     Diagnosis:   Encounter Diagnosis   Name Primary?    Bilateral hip pain Yes       DOS: NA    Physician: Josemanuel Duggan MD  Treatment Orders: PT Eval and Treat    No past medical history on file.  No current outpatient prescriptions on file.     No current facility-administered medications for this visit.      Review of patient's allergies indicates:  Allergies not on file  Precautions: Fall  Occupation: retired      Subjective  Onset/JIMMY: gradual  Involved Area/Location: bilateral  Current Symptoms: pain    Increases symptoms: Movement  Decreases Symptoms: Not moving    Current Function: Prolonged walking, standing and climbing stairs  Previous function: I in ADL's    Diagnostic Tests: MRI    Pain Scale: Keiry rates B leg pain to be between 4 - 8 on a scale of 1-10.    Patient Goals: decrease pain and Improve mobility and walk without pain  Pt reported no new c/o pain today.    Objective    Functional Limitations  Reports - G Codes    Category:  Mobility   Tool:  FOTO Outcomes Measurement System.   Score:  Patient scored out 24 of 100 on the FOTO Outcomes Measurement System.   Current:  G 8978 CL   Goal:  G 8979 CK          Observation: Pt was not willing flex her L hip in supine    Posture: Flexed trunk in standing       Passive Range of Motion: Knee   Left Right   Flexion: NT NT   Extension 0 0            Lower Extremity Strength  Right LE  Left LE    Knee extension: 3+/5 Knee extension: 3/5   Knee flexion: 2/5 Knee flexion: 2/5   Hip flexion: 2/5 Hip flexion: 2/5   Hip extension:  NT Hip extension: NT   Hip abduction/Glut Medius: NT Hip abduction/Glut medius NT   Hip adduction: NT Hip adduction: NT   Hip internal rotation: NT Hip internal rotation: NT   Hip external rotation: NT Hip  external rotation: NT   Ankle dorsiflexion: 5/5 Ankle dorsiflexion: 4/5   Ankle plantarflexion: NT Ankle plantarflexion: NT       Special Tests: Knee - NA    Joint Mobility: WNL  Palpation: B hip Adductors and hip flexors  Sensation: intact to light touch    Edema: none      Gait: Keiry ambulated 100 feet with no assistive device.    Balance: NT    PT Evaluation Completed? Yes  Discussed Plan of Care with patient: Yes    Treatment:  Pt performed there ex's for B LE strengthening, ROM, flexibility and endurance including:  Nu Step x 10 minutes - set up for Pt  UBE x 8 minutes  B Quad sets 10 x 3 with 3 sec hold  Gluteal sets 10 x 3 with 3 sec holld  B Ankle Dorsiflexion, Plantar flexion, inversion and eversion 10 x 3 each  Rolling R & L quadriceps and glut med.  Clams 10 x 3 on R & L  Supine B hip rotation 10 x 3  SAQ 20 x 3 on R & L - pt instructed in a new exercise  Exercises were reviewed and pt was able to demonstrate them prior to the end of the session.      Treatments not performed today:  Heel slides 10 x 0 on the L and 10 x 3 on the R - with slide board and a towel set up for Pt.  Ed pt to use ice PRN 10- 20 min when pain or swelling occurs.     Keiry demo good understanding of the education provided. Patient demo good return demo of skill of exercises.      Assessment  This is a 71 y.o. female referred to outpatient physical therapy and presents with a medical diagnosis of   Encounter Diagnosis   Name Primary?    Bilateral hip pain Yes    and demonstrates limitations as described in the problem list. Pt will benefit from physcial therapy services in order to maximize pain free and/or functional use of bilateral knees and hips. The following goals were discussed with the patient and patient is in agreement with them as to be addressed in the treatment plan.  Pt tolerated exercises well today.    Problem List: pain, decreased strength, decreased balance and stability and decreased ability to fully  participate in recreational/sports related activities.      GOALS: Short Term Goals:  3 weeks  1. Pt will demonstrate increased knee flexion AROM to 120 degrees.  2. Pt will demonstrate increased hip flexion AROM to 90 degrees  3. Independent with HEP to increase patient's tolerance for ADL  function    Long Term Goals: 6 weeks  1.Report decreased    B LE    pain  <   / =  2  /10 with walking to increase tolerance for ADL's  2.Increased MMT  for  B LE's  to increase tolerance for ADL and work activities.  3.Increased arom  for  B hips and knees to improve normal movement patterns during ADL's.  4. Pt will report improvement in overall functional abilities of LE, evidenced by improved score on the FOTO knee survey    Plan  Pt will be treated by physical therapy 2 times a week for 6 weeks for Pt Education, HEP, therapeutic exercises, neuromuscular re-education/ balance exercises, joint mobilizations, modalities prn   to achieve established goals. Keiry may at times be seen by a PTA as part of the Rehab Team.     Cont PT for  6         weeks.

## 2017-05-12 ENCOUNTER — CLINICAL SUPPORT (OUTPATIENT)
Dept: REHABILITATION | Facility: HOSPITAL | Age: 72
End: 2017-05-12
Attending: INTERNAL MEDICINE
Payer: COMMERCIAL

## 2017-05-12 DIAGNOSIS — M25.551 BILATERAL HIP PAIN: Primary | ICD-10-CM

## 2017-05-12 DIAGNOSIS — M25.552 BILATERAL HIP PAIN: Primary | ICD-10-CM

## 2017-05-12 PROCEDURE — 97110 THERAPEUTIC EXERCISES: CPT | Mod: PO

## 2017-05-12 NOTE — PROGRESS NOTES
Physical Therapy Daily Note    Visit: 6    Name: Keiry Rudolph  Clinic Number: [id number]    Keiry is a 71 y.o. female treated on 05/12/2017.     Diagnosis:   Encounter Diagnosis   Name Primary?    Bilateral hip pain Yes       DOS: NA    Physician: Krik, Provider  Treatment Orders: PT Eval and Treat    No past medical history on file.  No current outpatient prescriptions on file.     No current facility-administered medications for this visit.      Review of patient's allergies indicates:  Allergies not on file  Precautions: Fall  Occupation: retired      Subjective  Onset/JIMMY: gradual  Involved Area/Location: bilateral  Current Symptoms: pain    Increases symptoms: Movement  Decreases Symptoms: Not moving    Current Function: Prolonged walking, standing and climbing stairs  Previous function: I in ADL's    Diagnostic Tests: MRI    Pain Scale: Keiry rates B leg pain to be between 4 - 8 on a scale of 1-10.    Patient Goals: decrease pain and Improve mobility and walk without pain  Pt reported that her anterior thighs are tight and tender today.    Objective    Functional Limitations  Reports - G Codes    Category:  Mobility   Tool:  FOTO Outcomes Measurement System.   Score:  Patient scored out 24 of 100 on the FOTO Outcomes Measurement System.   Current:  G 8978 CL   Goal:  G 8979 CK          Observation: Pt was not willing flex her L hip in supine    Posture: Flexed trunk in standing       Passive Range of Motion: Knee   Left Right   Flexion: NT NT   Extension 0 0            Lower Extremity Strength  Right LE  Left LE    Knee extension: 3+/5 Knee extension: 3/5   Knee flexion: 2/5 Knee flexion: 2/5   Hip flexion: 2/5 Hip flexion: 2/5   Hip extension:  NT Hip extension: NT   Hip abduction/Glut Medius: NT Hip abduction/Glut medius NT   Hip adduction: NT Hip adduction: NT   Hip internal rotation: NT Hip internal rotation: NT    Hip external rotation: NT Hip external rotation: NT   Ankle dorsiflexion: 5/5 Ankle dorsiflexion: 4/5   Ankle plantarflexion: NT Ankle plantarflexion: NT       Special Tests: Knee - NA    Joint Mobility: WNL  Palpation: B hip Adductors and hip flexors  Sensation: intact to light touch    Edema: none      Gait: Keiry ambulated 100 feet with no assistive device.    Balance: NT    PT Evaluation Completed? Yes  Discussed Plan of Care with patient: Yes    Treatment:  Pt performed there ex's for B LE strengthening, ROM, flexibility and endurance including:  Nu Step x 10 minutes - set up for Pt  UBE x 8 minutes  B Quad sets 10 x 3 with 3 sec hold  Gluteal sets 10 x 3 with 3 sec holld  B Ankle Dorsiflexion, Plantar flexion, inversion and eversion 10 x 3 each  Rolling R & L quadriceps and hip flexors.  Clams 10 x 3 on R & L  Supine B hip rotation 10 x 3  SAQ 20 x 3 on R & L - pt instructed in a new exercise  Exercises were reviewed and pt was able to demonstrate them prior to the end of the session.      Treatments not performed today:  Heel slides 10 x 0 on the L and 10 x 3 on the R - with slide board and a towel set up for Pt.  Ed pt to use ice PRN 10- 20 min when pain or swelling occurs.     Keiry demo good understanding of the education provided. Patient demo good return demo of skill of exercises.      Assessment  This is a 71 y.o. female referred to outpatient physical therapy and presents with a medical diagnosis of   Encounter Diagnosis   Name Primary?    Bilateral hip pain Yes    and demonstrates limitations as described in the problem list. Pt will benefit from physcial therapy services in order to maximize pain free and/or functional use of bilateral knees and hips. The following goals were discussed with the patient and patient is in agreement with them as to be addressed in the treatment plan.  Pt tolerated exercises well today.    Problem List: pain, decreased strength, decreased balance and stability and  decreased ability to fully participate in recreational/sports related activities.      GOALS: Short Term Goals:  3 weeks  1. Pt will demonstrate increased knee flexion AROM to 120 degrees.  2. Pt will demonstrate increased hip flexion AROM to 90 degrees  3. Independent with HEP to increase patient's tolerance for ADL  function    Long Term Goals: 6 weeks  1.Report decreased    B LE    pain  <   / =  2  /10 with walking to increase tolerance for ADL's  2.Increased MMT  for  B LE's  to increase tolerance for ADL and work activities.  3.Increased arom  for  B hips and knees to improve normal movement patterns during ADL's.  4. Pt will report improvement in overall functional abilities of LE, evidenced by improved score on the FOTO knee survey    Plan  Pt will be treated by physical therapy 2 times a week for 6 weeks for Pt Education, HEP, therapeutic exercises, neuromuscular re-education/ balance exercises, joint mobilizations, modalities prn   to achieve established goals. Keiry may at times be seen by a PTA as part of the Rehab Team.     Cont PT for  6         weeks.

## 2017-05-23 ENCOUNTER — CLINICAL SUPPORT (OUTPATIENT)
Dept: REHABILITATION | Facility: HOSPITAL | Age: 72
End: 2017-05-23
Attending: INTERNAL MEDICINE
Payer: COMMERCIAL

## 2017-05-23 DIAGNOSIS — M25.552 BILATERAL HIP PAIN: Primary | ICD-10-CM

## 2017-05-23 DIAGNOSIS — M25.551 BILATERAL HIP PAIN: Primary | ICD-10-CM

## 2017-05-23 PROCEDURE — 97110 THERAPEUTIC EXERCISES: CPT | Mod: PO

## 2017-05-25 ENCOUNTER — CLINICAL SUPPORT (OUTPATIENT)
Dept: REHABILITATION | Facility: HOSPITAL | Age: 72
End: 2017-05-25
Attending: INTERNAL MEDICINE
Payer: COMMERCIAL

## 2017-05-25 DIAGNOSIS — M25.551 BILATERAL HIP PAIN: Primary | ICD-10-CM

## 2017-05-25 DIAGNOSIS — M25.552 BILATERAL HIP PAIN: Primary | ICD-10-CM

## 2017-05-25 PROCEDURE — 97110 THERAPEUTIC EXERCISES: CPT | Mod: PO

## 2017-05-26 NOTE — PROGRESS NOTES
Physical Therapy Daily Note    Visit: 7    Name: Keiry Rudolph  Clinic Number: [id number]    Keiry is a 71 y.o. female treated on 05/25/2017.     Diagnosis:   Encounter Diagnosis   Name Primary?    Bilateral hip pain Yes       DOS: NA    Physician: Kirk, Provider  Treatment Orders: PT Eval and Treat    No past medical history on file.  No current outpatient prescriptions on file.     No current facility-administered medications for this visit.      Review of patient's allergies indicates:  Allergies not on file  Precautions: Fall  Occupation: retired      Subjective  Onset/JIMMY: gradual  Involved Area/Location: bilateral  Current Symptoms: pain    Increases symptoms: Movement  Decreases Symptoms: Not moving    Current Function: Prolonged walking, standing and climbing stairs  Previous function: I in ADL's    Diagnostic Tests: MRI    Pain Scale: Keiry rates B leg pain to be between 4 - 8 on a scale of 1-10.    Patient Goals: decrease pain and Improve mobility and walk without pain  Pt reported that the rolling has helped decrease pain in her anterior thighs    Objective    Functional Limitations  Reports - G Codes    Category:  Mobility   Tool:  FOTO Outcomes Measurement System.   Score:  Patient scored out 24 of 100 on the FOTO Outcomes Measurement System.   Current:  G 8978 CL   Goal:  G 8979 CK          Observation: Pt was not willing flex her L hip in supine    Posture: Flexed trunk in standing       Passive Range of Motion: Knee   Left Right   Flexion: NT NT   Extension 0 0            Lower Extremity Strength  Right LE  Left LE    Knee extension: 3+/5 Knee extension: 3/5   Knee flexion: 2/5 Knee flexion: 2/5   Hip flexion: 2/5 Hip flexion: 2/5   Hip extension:  NT Hip extension: NT   Hip abduction/Glut Medius: NT Hip abduction/Glut medius NT   Hip adduction: NT Hip adduction: NT   Hip internal rotation: NT Hip internal  rotation: NT   Hip external rotation: NT Hip external rotation: NT   Ankle dorsiflexion: 5/5 Ankle dorsiflexion: 4/5   Ankle plantarflexion: NT Ankle plantarflexion: NT       Special Tests: Knee - NA    Joint Mobility: WNL  Palpation: B hip Adductors and hip flexors  Sensation: intact to light touch    Edema: none      Gait: Keiry ambulated 100 feet with no assistive device.    Balance: NT    Treatment:  Pt performed there ex's for B LE strengthening, ROM, flexibility and endurance including:  Nu Step x 10 minutes - set up for Pt  UBE x 8 minutes  B Quad sets 10 x 3 with 3 sec hold  Gluteal sets 10 x 3 with 3 sec holld  B Ankle Dorsiflexion, Plantar flexion, inversion and eversion 10 x 3 each  Clams 10 x 3 on R & L  Supine B hip rotation 10 x 3  SAQ 20 x 3 on R & L   Soft tissue rolling over B anterior thighs  Exercises were reviewed and pt was able to demonstrate them prior to the end of the session.      Treatments not performed today:  Heel slides 10 x 0 on the L and 10 x 3 on the R - with slide board and a towel set up for Pt.  Ed pt to use ice PRN 10- 20 min when pain or swelling occurs.     Keiry demo good understanding of the education provided. Patient demo good return demo of skill of exercises.      Assessment  This is a 71 y.o. female referred to outpatient physical therapy and presents with a medical diagnosis of   B hip pain and demonstrates limitations as described in the problem list. Pt will benefit from physcial therapy services in order to maximize pain free and/or functional use of bilateral knees and hips. The following goals were discussed with the patient and patient is in agreement with them as to be addressed in the treatment plan.  Pt tolerated exercises well today.    She would benefit from progressing to standing Hip Ex.    Problem List: pain, decreased strength, decreased balance and stability and decreased ability to fully participate in recreational/sports related  activities.      GOALS: Short Term Goals:  3 weeks  1. Pt will demonstrate increased knee flexion AROM to 120 degrees.  2. Pt will demonstrate increased hip flexion AROM to 90 degrees  3. Independent with HEP to increase patient's tolerance for ADL  function    Long Term Goals: 6 weeks  1.Report decreased    B LE    pain  <   / =  2  /10 with walking to increase tolerance for ADL's  2.Increased MMT  for  B LE's  to increase tolerance for ADL and work activities.  3.Increased arom  for  B hips and knees to improve normal movement patterns during ADL's.  4. Pt will report improvement in overall functional abilities of LE, evidenced by improved score on the FOTO knee survey    Plan  Pt will be treated by physical therapy 2 times a week for 6 weeks for Pt Education, HEP, therapeutic exercises, neuromuscular re-education/ balance exercises, joint mobilizations, modalities prn   to achieve established goals. Keiry may at times be seen by a PTA as part of the Rehab Team.     Cont PT for  6         weeks.

## 2017-05-30 ENCOUNTER — CLINICAL SUPPORT (OUTPATIENT)
Dept: REHABILITATION | Facility: HOSPITAL | Age: 72
End: 2017-05-30
Attending: INTERNAL MEDICINE
Payer: COMMERCIAL

## 2017-05-30 DIAGNOSIS — M25.552 BILATERAL HIP PAIN: Primary | ICD-10-CM

## 2017-05-30 DIAGNOSIS — M25.551 BILATERAL HIP PAIN: Primary | ICD-10-CM

## 2017-05-30 PROCEDURE — 97110 THERAPEUTIC EXERCISES: CPT | Mod: PO

## 2017-05-31 ENCOUNTER — CLINICAL SUPPORT (OUTPATIENT)
Dept: REHABILITATION | Facility: HOSPITAL | Age: 72
End: 2017-05-31
Attending: INTERNAL MEDICINE
Payer: COMMERCIAL

## 2017-05-31 DIAGNOSIS — M25.551 BILATERAL HIP PAIN: Primary | ICD-10-CM

## 2017-05-31 DIAGNOSIS — M25.552 BILATERAL HIP PAIN: Primary | ICD-10-CM

## 2017-05-31 PROCEDURE — 97530 THERAPEUTIC ACTIVITIES: CPT | Mod: PO

## 2017-05-31 PROCEDURE — 97110 THERAPEUTIC EXERCISES: CPT | Mod: PO

## 2017-06-01 NOTE — PROGRESS NOTES
Physical Therapy Daily Note    Visit: 15    Name: Keiry Rudolph  Clinic Number: [id number]    Keiry is a 71 y.o. female treated on 05/31/2017.     Diagnosis:   Encounter Diagnosis   Name Primary?    Bilateral hip pain Yes       DOS: NA    Physician: Kirk, Provider  Treatment Orders: PT Eval and Treat    No past medical history on file.  No current outpatient prescriptions on file.     No current facility-administered medications for this visit.      Review of patient's allergies indicates:  Allergies not on file  Precautions: Fall  Occupation: retired      Subjective  Onset/JIMMY: gradual  Involved Area/Location: bilateral  Current Symptoms: pain    Increases symptoms: Movement  Decreases Symptoms: Not moving    Current Function: Prolonged walking, standing and climbing stairs  Previous function: I in ADL's    Diagnostic Tests: MRI    Pain Scale: Keiry rates B leg pain to be between 4 - 8 on a scale of 1-10.    Patient Goals: decrease pain and Improve mobility and walk without pain  Pt reported that she will be in and out of town over the next two months.    Objective    Functional Limitations  Reports - G Codes    Category:  Mobility   Tool:  FOTO Outcomes Measurement System.   Score:  Patient scored out 24 of 100 on the FOTO Outcomes Measurement System.   Current:  G 8978 CL   Goal:  G 8979 CK          Observation: Pt was not willing flex her L hip in supine    Posture: Flexed trunk in standing       Passive Range of Motion: Knee   Left Right   Flexion: NT NT   Extension 0 0            Lower Extremity Strength  Right LE  Left LE    Knee extension: 3+/5 Knee extension: 3/5   Knee flexion: 2/5 Knee flexion: 2/5   Hip flexion: 2/5 Hip flexion: 2/5   Hip extension:  NT Hip extension: NT   Hip abduction/Glut Medius: NT Hip abduction/Glut medius NT   Hip adduction: NT Hip adduction: NT   Hip internal rotation: NT Hip internal  rotation: NT   Hip external rotation: NT Hip external rotation: NT   Ankle dorsiflexion: 5/5 Ankle dorsiflexion: 4/5   Ankle plantarflexion: NT Ankle plantarflexion: NT       Special Tests: Knee - NA    Joint Mobility: WNL  Palpation: B hip Adductors and hip flexors  Sensation: intact to light touch    Edema: none      Gait: Keiry ambulated 100 feet with no assistive device.    Balance: NT    Treatment:  Pt performed there ex's for B LE strengthening, ROM, flexibility and endurance including:  Nu Step x 10 minutes - set up for Pt - increased to level 7 today.  UBE x 10 minutes  B Quad sets 10 x 3 with 3 sec hold  Gluteal sets 10 x 3 with 3 sec holld  B Ankle Dorsiflexion, Plantar flexion, inversion and eversion 10 x 3 each  Clams 10 x 3 on R & L with orange TB - set up for Pt  Supine B hip rotation 10 x 3  SAQ 20 x 3 on R & L with 3#  LAQ 10 x 3 on R & L with 3# on each   Soft tissue rolling over B anterior and lateral thighs  Exercises were reviewed and pt was able to demonstrate them prior to the end of the session.      Treatments not performed today:  Heel slides 10 x 0 on the L and 10 x 3 on the R - with slide board and a towel set up for Pt.  Ed pt to use ice PRN 10- 20 min when pain or swelling occurs.     Keiry demo good understanding of the education provided. Patient demo good return demo of skill of exercises.      Assessment  This is a 71 y.o. female referred to outpatient physical therapy and presents with a medical diagnosis of   B hip pain and demonstrates limitations as described in the problem list. Pt will benefit from physcial therapy services in order to maximize pain free and/or functional use of bilateral knees and hips. The following goals were discussed with the patient and patient is in agreement with them as to be addressed in the treatment plan.  Pt tolerated increased weight with a few exercises    Problem List: pain, decreased strength, decreased balance and stability and decreased  ability to fully participate in recreational/sports related activities.      GOALS: Short Term Goals:  3 weeks  1. Pt will demonstrate increased knee flexion AROM to 120 degrees.  2. Pt will demonstrate increased hip flexion AROM to 90 degrees  3. Independent with HEP to increase patient's tolerance for ADL  function    Long Term Goals: 6 weeks  1.Report decreased    B LE    pain  <   / =  2  /10 with walking to increase tolerance for ADL's  2.Increased MMT  for  B LE's  to increase tolerance for ADL and work activities.  3.Increased arom  for  B hips and knees to improve normal movement patterns during ADL's.  4. Pt will report improvement in overall functional abilities of LE, evidenced by improved score on the FOTO knee survey    Plan  Pt will be treated by physical therapy 2 times a week for 6 weeks for Pt Education, HEP, therapeutic exercises, neuromuscular re-education/ balance exercises, joint mobilizations, modalities prn   to achieve established goals. Keiry may at times be seen by a PTA as part of the Rehab Team.     Cont PT for  6         weeks.

## 2017-06-20 ENCOUNTER — CLINICAL SUPPORT (OUTPATIENT)
Dept: REHABILITATION | Facility: HOSPITAL | Age: 72
End: 2017-06-20
Attending: INTERNAL MEDICINE
Payer: COMMERCIAL

## 2017-06-20 DIAGNOSIS — M25.552 BILATERAL HIP PAIN: Primary | ICD-10-CM

## 2017-06-20 DIAGNOSIS — M25.551 BILATERAL HIP PAIN: Primary | ICD-10-CM

## 2017-06-20 PROCEDURE — 97110 THERAPEUTIC EXERCISES: CPT | Mod: PO

## 2017-06-20 NOTE — PROGRESS NOTES
Physical Therapy Daily Note    Visit: 15    Name: Keiry Rudolph  Clinic Number: [id number]    Keiry is a 71 y.o. female treated on 06/20/2017.     Diagnosis:   Encounter Diagnosis   Name Primary?    Bilateral hip pain Yes       DOS: NA    Physician: Josemanuel Duggan MD  Treatment Orders: PT Eval and Treat    No past medical history on file.  No current outpatient prescriptions on file.     No current facility-administered medications for this visit.      Review of patient's allergies indicates:  Allergies not on file  Precautions: Fall  Occupation: retired      Subjective  Onset/JIMMY: gradual  Involved Area/Location: bilateral  Current Symptoms: pain    Increases symptoms: Movement  Decreases Symptoms: Not moving    Current Function: Prolonged walking, standing and climbing stairs  Previous function: I in ADL's    Diagnostic Tests: MRI    Pain Scale: Keiry rates B leg pain to be between 4 - 8 on a scale of 1-10.    Patient Goals: decrease pain and Improve mobility and walk without pain  Pt reported that she is not very sore today.    Objective    Functional Limitations  Reports - G Codes    Category:  Mobility   Tool:  FOTO Outcomes Measurement System.   Score:  Patient scored out 24 of 100 on the FOTO Outcomes Measurement System.   Current:  G 8978 CL   Goal:  G 8979 CK          Observation: Pt was not willing flex her L hip in supine    Posture: Flexed trunk in standing       Passive Range of Motion: Knee   Left Right   Flexion: NT NT   Extension 0 0            Lower Extremity Strength  Right LE  Left LE    Knee extension: 3+/5 Knee extension: 3/5   Knee flexion: 2/5 Knee flexion: 2/5   Hip flexion: 2/5 Hip flexion: 2/5   Hip extension:  NT Hip extension: NT   Hip abduction/Glut Medius: NT Hip abduction/Glut medius NT   Hip adduction: NT Hip adduction: NT   Hip internal rotation: NT Hip internal rotation: NT   Hip external  rotation: NT Hip external rotation: NT   Ankle dorsiflexion: 5/5 Ankle dorsiflexion: 4/5   Ankle plantarflexion: NT Ankle plantarflexion: NT       Special Tests: Knee - NA    Joint Mobility: WNL  Palpation: B hip Adductors and hip flexors  Sensation: intact to light touch    Edema: none      Gait: Keiry ambulated 100 feet with no assistive device.    Balance: NT    Treatment:  Pt performed there ex's for B LE strengthening, ROM, flexibility and endurance including:  Nu Step x 10 minutes - set up for Pt - increased to level 7 today.  UBE x 10 minutes  B Quad sets 10 x 3 with 3 sec hold  Gluteal sets 10 x 3 with 3 sec holld  B Ankle Dorsiflexion, Plantar flexion, inversion and eversion 10 x 3 each  Clams 10 x 3 on R & L with orange TB - set up for Pt  Supine B hip rotation 10 x 3  SAQ 20 x 3 on R & L with 3#  LAQ 10 x 3 on R & L with 3# on each   Soft tissue rolling over B anterior and lateral thighs  Exercises were reviewed and pt was able to demonstrate them prior to the end of the session.      Treatments not performed today:  Heel slides 10 x 0 on the L and 10 x 3 on the R - with slide board and a towel set up for Pt.  Ed pt to use ice PRN 10- 20 min when pain or swelling occurs.     Keiry demo good understanding of the education provided. Patient demo good return demo of skill of exercises.      Assessment  This is a 71 y.o. female referred to outpatient physical therapy and presents with a medical diagnosis of   B hip pain and demonstrates limitations as described in the problem list. Pt will benefit from physcial therapy services in order to maximize pain free and/or functional use of bilateral knees and hips.  The following goals were discussed with the patient and patient is in agreement with them as to be addressed in the treatment plan.  Pt tolerated increased weight with a few exercises    Problem List: pain, decreased strength, decreased balance and stability and decreased ability to fully participate  in recreational/sports related activities.      GOALS: Short Term Goals:  3 weeks  1. Pt will demonstrate increased knee flexion AROM to 120 degrees.  2. Pt will demonstrate increased hip flexion AROM to 90 degrees  3. Independent with HEP to increase patient's tolerance for ADL  function    Long Term Goals: 6 weeks  1.Report decreased    B LE    pain  <   / =  2  /10 with walking to increase tolerance for ADL's  2.Increased MMT  for  B LE's  to increase tolerance for ADL and work activities.  3.Increased arom  for  B hips and knees to improve normal movement patterns during ADL's.  4. Pt will report improvement in overall functional abilities of LE, evidenced by improved score on the FOTO knee survey    Plan  Pt will be treated by physical therapy 2 times a week for 6 weeks for Pt Education, HEP, therapeutic exercises, neuromuscular re-education/ balance exercises, joint mobilizations, modalities prn   to achieve established goals. Keiry may at times be seen by a PTA as part of the Rehab Team.     Cont PT for  6         weeks.

## 2017-06-21 ENCOUNTER — CLINICAL SUPPORT (OUTPATIENT)
Dept: REHABILITATION | Facility: HOSPITAL | Age: 72
End: 2017-06-21
Attending: INTERNAL MEDICINE
Payer: COMMERCIAL

## 2017-06-21 PROCEDURE — 97110 THERAPEUTIC EXERCISES: CPT | Mod: PO

## 2017-06-21 NOTE — PROGRESS NOTES
Physical Therapy Daily Note    Visit: 15    Name: Keiry Rudolph  Clinic Number: [id number]    Keiry is a 71 y.o. female treated on 06/21/2017.     Diagnosis:   No diagnosis found.    DOS: NA    Physician: Kirk, Provider  Treatment Orders: PT Eval and Treat    No past medical history on file.  No current outpatient prescriptions on file.     No current facility-administered medications for this visit.      Review of patient's allergies indicates:  Allergies not on file  Precautions: Fall  Occupation: retired      Subjective  Onset/JIMMY: gradual  Involved Area/Location: bilateral  Current Symptoms: pain    Increases symptoms: Movement  Decreases Symptoms: Not moving    Current Function: Prolonged walking, standing and climbing stairs  Previous function: I in ADL's    Diagnostic Tests: MRI    Pain Scale: Keiry rates B leg pain to be between 4 - 8 on a scale of 1-10.    Patient Goals: decrease pain and Improve mobility and walk without pain  Pt reported that she will be in and out of town over the next two months and may not be able to come in for Tx due to company in town.    Objective    Functional Limitations  Reports - G Codes    Category:  Mobility   Tool:  FOTO Outcomes Measurement System.   Score:  Patient scored out 24 of 100 on the FOTO Outcomes Measurement System.   Current:  G 8978 CL   Goal:  G 8979 CK          Observation: Pt was not willing flex her L hip in supine    Posture: Flexed trunk in standing       Passive Range of Motion: Knee   Left Right   Flexion: NT NT   Extension 0 0            Lower Extremity Strength  Right LE  Left LE    Knee extension: 3+/5 Knee extension: 3/5   Knee flexion: 2/5 Knee flexion: 2/5   Hip flexion: 2/5 Hip flexion: 2/5   Hip extension:  NT Hip extension: NT   Hip abduction/Glut Medius: NT Hip abduction/Glut medius NT   Hip adduction: NT Hip adduction: NT   Hip internal rotation: NT Hip  internal rotation: NT   Hip external rotation: NT Hip external rotation: NT   Ankle dorsiflexion: 5/5 Ankle dorsiflexion: 4/5   Ankle plantarflexion: NT Ankle plantarflexion: NT       Special Tests: Knee - NA    Joint Mobility: WNL  Palpation: B hip Adductors and hip flexors  Sensation: intact to light touch    Edema: none      Gait: Keiry ambulated 100 feet with no assistive device.    Balance: NT    Treatment:  Pt performed there ex's for B LE strengthening, ROM, flexibility and endurance including:  Nu Step x 10 minutes - set up for Pt - increased to level 7 today.  UBE x 10 minutes  B Quad sets 10 x 3 with 3 sec hold  Gluteal sets 10 x 3 with 3 sec holld  B Ankle Dorsiflexion, Plantar flexion, inversion and eversion 10 x 3 each  Clams 10 x 3 on R & L with orange TB - set up for Pt  Supine B hip rotation 10 x 3  SAQ 20 x 3 on R & L with 3#  LAQ 10 x 3 on R & L with 3# on each   Soft tissue rolling over B anterior and lateral thighs  Exercises were reviewed and pt was able to demonstrate them prior to the end of the session.      Treatments not performed today:  Heel slides 10 x 0 on the L and 10 x 3 on the R - with slide board and a towel set up for Pt.  Ed pt to use ice PRN 10- 20 min when pain or swelling occurs.     Keiry demo good understanding of the education provided. Patient demo good return demo of skill of exercises.      Assessment  This is a 71 y.o. female referred to outpatient physical therapy and presents with a medical diagnosis of   B hip pain and demonstrates limitations as described in the problem list. Pt will benefit from physcial therapy services in order to maximize pain free and/or functional use of bilateral knees and hips. Requesting an additioinal 12 visits for Mrs. Rudolph  The following goals were discussed with the patient and patient is in agreement with them as to be addressed in the treatment plan.  Pt tolerated increased weight with a few exercises    Problem List: pain,  decreased strength, decreased balance and stability and decreased ability to fully participate in recreational/sports related activities.      GOALS: Short Term Goals:  3 weeks  1. Pt will demonstrate increased knee flexion AROM to 120 degrees.  2. Pt will demonstrate increased hip flexion AROM to 90 degrees  3. Independent with HEP to increase patient's tolerance for ADL  function    Long Term Goals: 6 weeks  1.Report decreased    B LE    pain  <   / =  2  /10 with walking to increase tolerance for ADL's  2.Increased MMT  for  B LE's  to increase tolerance for ADL and work activities.  3.Increased arom  for  B hips and knees to improve normal movement patterns during ADL's.  4. Pt will report improvement in overall functional abilities of LE, evidenced by improved score on the FOTO knee survey    Plan  Pt will be treated by physical therapy 2 times a week for 6 weeks for Pt Education, HEP, therapeutic exercises, neuromuscular re-education/ balance exercises, joint mobilizations, modalities prn   to achieve established goals. Keiry may at times be seen by a PTA as part of the Rehab Team.     Cont PT for  6         weeks.

## 2017-08-01 ENCOUNTER — CLINICAL SUPPORT (OUTPATIENT)
Dept: REHABILITATION | Facility: HOSPITAL | Age: 72
End: 2017-08-01
Attending: INTERNAL MEDICINE
Payer: COMMERCIAL

## 2017-08-01 DIAGNOSIS — M25.552 BILATERAL HIP PAIN: Primary | ICD-10-CM

## 2017-08-01 DIAGNOSIS — M25.551 BILATERAL HIP PAIN: Primary | ICD-10-CM

## 2017-08-01 PROCEDURE — 97110 THERAPEUTIC EXERCISES: CPT | Mod: PO

## 2017-08-01 NOTE — PROGRESS NOTES
Physical Therapy Daily Note    Visit: 16    Name: Keiry Rudolph  Clinic Number: [id number]    Keiry is a 71 y.o. female treated on 08/01/2017.     Diagnosis:   Encounter Diagnosis   Name Primary?    Bilateral hip pain Yes       DOS: NA    Physician: Kirk, Provider  Treatment Orders: PT Eval and Treat    No past medical history on file.  No current outpatient prescriptions on file.     No current facility-administered medications for this visit.      Review of patient's allergies indicates:  Allergies not on file  Precautions: Fall  Occupation: retired      Subjective  Onset/JIMMY: gradual  Involved Area/Location: bilateral  Current Symptoms: pain    Increases symptoms: Movement  Decreases Symptoms: Not moving    Current Function: Prolonged walking, standing and climbing stairs  Previous function: I in ADL's    Diagnostic Tests: MRI    Pain Scale: Keiry rates B leg pain to be between 4 - 8 on a scale of 1-10.    Patient Goals: decrease pain and Improve mobility and walk without pain  Pt reported that while she was out of town she received an steroid injection in her L hip that helped decrease her pain tremendously.  She stated that she may be scheduling a L THR in the necxt year.  Pt also reported that she has a hematoma on her L anterior thigh form the injection last Wednesday    Objective    Functional Limitations  Reports - G Codes    Category:  Mobility   Tool:  FOTO Outcomes Measurement System.   Score:  Patient scored out 24 of 100 on the FOTO Outcomes Measurement System.   Current:  G 8978 CL   Goal:  G 8979 CK          Observation: Pt was not willing flex her L hip in supine    Posture: Flexed trunk in standing       Passive Range of Motion: Knee   Left Right   Flexion: NT NT   Extension 0 0            Lower Extremity Strength - not tested today secondary to Pt's fear of her L hip pain returning.  Right LE  Left LE     Knee extension: 3+/5 Knee extension: 3/5   Knee flexion: 2/5 Knee flexion: 2/5   Hip flexion: 2/5 Hip flexion: 2/5   Hip extension:  NT Hip extension: NT   Hip abduction/Glut Medius: NT Hip abduction/Glut medius NT   Hip adduction: NT Hip adduction: NT   Hip internal rotation: NT Hip internal rotation: NT   Hip external rotation: NT Hip external rotation: NT   Ankle dorsiflexion: 5/5 Ankle dorsiflexion: 4/5   Ankle plantarflexion: NT Ankle plantarflexion: NT       Special Tests: Knee - NA    Joint Mobility: WNL  Palpation: Not tested today  Sensation: intact to light touch    Edema: none      Gait: Keiry ambulated 100 feet with no assistive device.    Balance: NT    Treatment:  Pt performed there ex's for B LE strengthening, ROM, flexibility and endurance including:  Nu Step x 10 minutes - set up for Pt - NP  UBE x 10 minutes  B Quad sets 10 x 3 with 3 sec hold  Gluteal sets 10 x 3 with 3 sec holld  B Ankle Dorsiflexion, Plantar flexion, inversion and eversion 10 x 3 each  Clams 10 x 3 on R & L with orange TB - NP  Supine B hip rotation 10 x 3 - NP  SAQ 20 x 3 on R & L with 3#  LAQ 10 x 3 on R & L with 3# on each - NP  Soft tissue rolling over B anterior and lateral thighs   Exercises were reviewed and pt was able to demonstrate them prior to the end of the session.      Treatments not performed today:  Heel slides 10 x 0 on the L and 10 x 3 on the R - with slide board and a towel set up for Pt.  Ed pt to use ice PRN 10- 20 min when pain or swelling occurs.     Keiry demo good understanding of the education provided. Patient demo good return demo of skill of exercises.      Assessment  This is a 71 y.o. female referred to outpatient physical therapy and presents with a medical diagnosis of   B hip pain and demonstrates limitations as described in the problem list. Pt will benefit from physcial therapy services in order to maximize pain free and/or functional use of bilateral knees and hips. Pt reported a  significant decrease in her L hip pain following a cortisone injection last week.  She is presently hesitant to resume all of her past exercise routine or have the hip re-evaluated secondary to fear that her pain may return    Problem List: pain, decreased strength, decreased balance and stability and decreased ability to fully participate in recreational/sports related activities.      GOALS: Short Term Goals:  3 weeks  1. Pt will demonstrate increased knee flexion AROM to 120 degrees.  2. Pt will demonstrate increased hip flexion AROM to 90 degrees  3. Independent with HEP to increase patient's tolerance for ADL  function    Long Term Goals: 6 weeks  1.Report decreased    B LE    pain  <   / =  2  /10 with walking to increase tolerance for ADL's  2.Increased MMT  for  B LE's  to increase tolerance for ADL and work activities.  3.Increased arom  for  B hips and knees to improve normal movement patterns during ADL's.  4. Pt will report improvement in overall functional abilities of LE, evidenced by improved score on the FOTO knee survey    Plan  Pt will be treated by physical therapy 2 times a week for 2 weeks for Pt Education, HEP, therapeutic exercises, neuromuscular re-education/ balance exercises, joint mobilizations, modalities prn   to achieve established goals. Keiry may at times be seen by a PTA as part of the Rehab Team.  We have requested further authorization for Physical Therapy Tx and are awaiting authorization.    Cont PT for  2        weeks.

## 2017-08-03 ENCOUNTER — CLINICAL SUPPORT (OUTPATIENT)
Dept: REHABILITATION | Facility: HOSPITAL | Age: 72
End: 2017-08-03
Attending: INTERNAL MEDICINE
Payer: COMMERCIAL

## 2017-08-03 DIAGNOSIS — M25.552 BILATERAL HIP PAIN: Primary | ICD-10-CM

## 2017-08-03 DIAGNOSIS — M25.551 BILATERAL HIP PAIN: Primary | ICD-10-CM

## 2017-08-03 PROCEDURE — 97530 THERAPEUTIC ACTIVITIES: CPT | Mod: PO

## 2017-08-03 PROCEDURE — 97110 THERAPEUTIC EXERCISES: CPT | Mod: PO

## 2017-08-03 NOTE — PROGRESS NOTES
Physical Therapy Daily Note    Visit: 16    Name: Keiry Rudolph  Clinic Number: [id number]    Keiry is a 71 y.o. female treated on 08/03/2017.     Diagnosis:   Encounter Diagnosis   Name Primary?    Bilateral hip pain Yes       DOS: NA    Physician: Kirk, Provider  Treatment Orders: PT Eval and Treat    No past medical history on file.  No current outpatient prescriptions on file.     No current facility-administered medications for this visit.      Review of patient's allergies indicates:  Allergies not on file  Precautions: Fall  Occupation: retired      Subjective  Onset/JIMMY: gradual  Involved Area/Location: bilateral  Current Symptoms: pain    Increases symptoms: Movement  Decreases Symptoms: Not moving    Current Function: Prolonged walking, standing and climbing stairs  Previous function: I in ADL's    Diagnostic Tests: MRI    Pain Scale: Keiry rates B leg pain to be between 4 - 8 on a scale of 1-10.    Patient Goals: decrease pain and Improve mobility and walk without pain  Pt reported that she had a little discomfort in both LE's at different times over the past few days, but they did not involve her L hip.   Pt repeated that she did not feel comfortable performing Hip ABD with a band or hip adduction with a ball in hook lying.    Objective    Functional Limitations  Reports - G Codes    Category:  Mobility   Tool:  FOTO Outcomes Measurement System.   Score:  Patient scored out 24 of 100 on the FOTO Outcomes Measurement System.   Current:  G 1318 CL   Goal:  G 8979 CK          Observation: Pt was not willing flex her L hip in supine    Posture: Flexed trunk in standing       Passive Range of Motion: Knee   Left Right   Flexion: NT NT   Extension 0 0            Lower Extremity Strength - not tested today secondary to Pt's fear of her L hip pain returning.  Right LE  Left LE    Knee extension: 3+/5 Knee extension: 3/5    Knee flexion: 2/5 Knee flexion: 2/5   Hip flexion: 2/5 Hip flexion: 2/5   Hip extension:  NT Hip extension: NT   Hip abduction/Glut Medius: NT Hip abduction/Glut medius NT   Hip adduction: NT Hip adduction: NT   Hip internal rotation: NT Hip internal rotation: NT   Hip external rotation: NT Hip external rotation: NT   Ankle dorsiflexion: 5/5 Ankle dorsiflexion: 4/5   Ankle plantarflexion: NT Ankle plantarflexion: NT       Special Tests: Knee - NA    Joint Mobility: WNL  Palpation: Not tested today  Sensation: intact to light touch    Edema: none      Gait: Keiry ambulated 100 feet with no assistive device.    Balance: NT    Treatment:  Pt performed there ex's for B LE strengthening, ROM, flexibility and endurance including:  Nu Step x 10 minutes level 7 - set up for Pt   UBE x 10 minutes  B Quad sets 10 x 3 with 3 sec hold - with towel roll  Gluteal sets 10 x 3 with 3 sec holld  B Ankle Dorsiflexion, Plantar flexion, inversion and eversion 10 x 3 each  Supine B hip rotation 10 x 2  Hip abd isometrics in hook lying with strap around knees 10 x 3  Abdominal bracing 10 x 3  Bridges 10 x 3 with strap around her knees  Exercises were reviewed and pt was able to demonstrate them prior to the end of the session.      Treatments not performed today:  Heel slides 10 x 0 on the L and 10 x 3 on the R - with slide board and a towel set up for Pt.  Clams 10 x 3 on R & L with orange TB - NP  SAQ 20 x 3 on R & L with 3# - NP  LAQ 10 x 3 on R & L with 3# on each - NP  Soft tissue rolling over B anterior and lateral thighs - NP    Ed pt to use ice PRN 10- 20 min when pain or swelling occurs.     Keiry demo good understanding of the education provided. Patient demo good return demo of skill of exercises.      Assessment  This is a 71 y.o. female referred to outpatient physical therapy and presents with a medical diagnosis of   B hip pain and demonstrates limitations as described in the problem list. Pt will benefit from physcial  therapy services in order to maximize pain free and/or functional use of bilateral knees and hips. Pt reported a significant decrease in her L hip pain following a cortisone injection last week.  She is presently hesitant to resume all of her past exercise routine or have the hip re-evaluated secondary to fear that her pain may return.  Pt tolerated all exercises well today.    Problem List: pain, decreased strength, decreased balance and stability and decreased ability to fully participate in recreational/sports related activities.      GOALS: Short Term Goals:  3 weeks  1. Pt will demonstrate increased knee flexion AROM to 120 degrees.  2. Pt will demonstrate increased hip flexion AROM to 90 degrees  3. Independent with HEP to increase patient's tolerance for ADL  function    Long Term Goals: 6 weeks  1.Report decreased    B LE    pain  <   / =  2  /10 with walking to increase tolerance for ADL's  2.Increased MMT  for  B LE's  to increase tolerance for ADL and work activities.  3.Increased arom  for  B hips and knees to improve normal movement patterns during ADL's.  4. Pt will report improvement in overall functional abilities of LE, evidenced by improved score on the FOTO knee survey    Plan  Pt will be treated by physical therapy 2 times a week for 2 weeks for Pt Education, HEP, therapeutic exercises, neuromuscular re-education/ balance exercises, joint mobilizations, modalities prn   to achieve established goals. Keiry may at times be seen by a PTA as part of the Rehab Team.  We have requested further authorization for Physical Therapy Tx and are awaiting authorization.    Cont PT for  2        weeks.

## 2017-08-08 ENCOUNTER — CLINICAL SUPPORT (OUTPATIENT)
Dept: REHABILITATION | Facility: HOSPITAL | Age: 72
End: 2017-08-08
Attending: INTERNAL MEDICINE
Payer: COMMERCIAL

## 2017-08-08 DIAGNOSIS — M25.551 BILATERAL HIP PAIN: Primary | ICD-10-CM

## 2017-08-08 DIAGNOSIS — M25.552 BILATERAL HIP PAIN: Primary | ICD-10-CM

## 2017-08-08 PROCEDURE — 97110 THERAPEUTIC EXERCISES: CPT | Mod: PO

## 2017-08-08 NOTE — PROGRESS NOTES
Physical Therapy Daily Note    Visit: 17    Name: Keiry Rudolph  Clinic Number: [id number]    Keiry is a 71 y.o. female treated on 08/08/2017.     Diagnosis:   Encounter Diagnosis   Name Primary?    Bilateral hip pain Yes       DOS: NA    Physician: Kirk, Provider  Treatment Orders: PT Eval and Treat    No past medical history on file.  No current outpatient prescriptions on file.     No current facility-administered medications for this visit.      Review of patient's allergies indicates:  Allergies not on file  Precautions: Fall  Occupation: retired      Subjective  Onset/JIMMY: gradual  Involved Area/Location: bilateral  Current Symptoms: pain    Increases symptoms: Movement  Decreases Symptoms: Not moving    Current Function: Prolonged walking, standing and climbing stairs  Previous function: I in ADL's    Diagnostic Tests: MRI    Pain Scale: Keiry rates B leg pain to be between 4 - 8 on a scale of 1-10.    Patient Goals: decrease pain and Improve mobility and walk without pain  Pt reported that her left hip feels OK today.    Objective    Functional Limitations  Reports - G Codes    Category:  Mobility   Tool:  FOTO Outcomes Measurement System.   Score:  Patient scored out 24 of 100 on the FOTO Outcomes Measurement System.   Current:  G 8978 CL   Goal:  G 8979 CK          Observation: Pt was not willing flex her L hip in supine    Posture: Flexed trunk in standing       Passive Range of Motion: Knee   Left Right   Flexion: NT NT   Extension 0 0            Lower Extremity Strength - not tested today secondary to Pt's fear of her L hip pain returning.  Right LE  Left LE    Knee extension: 3+/5 Knee extension: 3/5   Knee flexion: 2/5 Knee flexion: 2/5   Hip flexion: 2/5 Hip flexion: 2/5   Hip extension:  NT Hip extension: NT   Hip abduction/Glut Medius: NT Hip abduction/Glut medius NT   Hip adduction: NT Hip adduction: NT    Hip internal rotation: NT Hip internal rotation: NT   Hip external rotation: NT Hip external rotation: NT   Ankle dorsiflexion: 5/5 Ankle dorsiflexion: 4/5   Ankle plantarflexion: NT Ankle plantarflexion: NT       Special Tests: Knee - NA    Joint Mobility: WNL  Palpation: Not tested today  Sensation: intact to light touch    Edema: none      Gait: Keiry ambulated 100 feet with no assistive device.    Balance: NT    Treatment:  Pt performed there ex's for B LE strengthening, ROM, flexibility and endurance including:  Nu Step x 10 minutes level 7 - set up for Pt   UBE x 10 minutes  B Quad sets 10 x 3 with 3 sec hold - with towel roll  Gluteal sets 10 x 3 with 3 sec holld  B Ankle Dorsiflexion, Plantar flexion, inversion and eversion 10 x 3 each  Supine B hip rotation 10 x 2  Hip abd isometrics in hook lying with strap around knees 10 x 3 - NP  Abdominal bracing 10 x 3  SAQ 20 x 3 on R & L 0#  Bridges 10 x 3   Soft tissue rolling over B anterior and lateral thighs  Exercises were reviewed and pt was able to demonstrate them prior to the end of the session.      Treatments not performed today:  Heel slides 10 x 0 on the L and 10 x 3 on the R - with slide board and a towel set up for Pt.  Clams 10 x 3 on R & L with orange TB - NP  LAQ 10 x 3 on R & L with 3# on each - NP      Ed pt to use ice PRN 10- 20 min when pain or swelling occurs.     Keiry demo good understanding of the education provided. Patient demo good return demo of skill of exercises.      Assessment  This is a 71 y.o. female referred to outpatient physical therapy and presents with a medical diagnosis of   B hip pain and demonstrates limitations as described in the problem list. Pt will benefit from physcial therapy services in order to maximize pain free and/or functional use of bilateral knees and hips. Pt reported a significant decrease in her L hip pain following a cortisone injection last week.  She is presently hesitant to resume all of her past  exercise routine or have the hip re-evaluated secondary to fear that her pain may return.  Pt tolerated all exercises well today.  She is not comfortable with attempting hip add or abd exercises.    Problem List: pain, decreased strength, decreased balance and stability and decreased ability to fully participate in recreational/sports related activities.      GOALS: Short Term Goals:  3 weeks  1. Pt will demonstrate increased knee flexion AROM to 120 degrees.  2. Pt will demonstrate increased hip flexion AROM to 90 degrees  3. Independent with HEP to increase patient's tolerance for ADL  function    Long Term Goals: 6 weeks  1.Report decreased    B LE    pain  <   / =  2  /10 with walking to increase tolerance for ADL's  2.Increased MMT  for  B LE's  to increase tolerance for ADL and work activities.  3.Increased arom  for  B hips and knees to improve normal movement patterns during ADL's.  4. Pt will report improvement in overall functional abilities of LE, evidenced by improved score on the FOTO knee survey    Plan  Pt will be treated by physical therapy 2 times a week for 2 weeks for Pt Education, HEP, therapeutic exercises, neuromuscular re-education/ balance exercises, joint mobilizations, modalities prn   to achieve established goals. Keiry may at times be seen by a PTA as part of the Rehab Team.  We have requested further authorization for Physical Therapy Tx and are awaiting authorization.    Cont PT for  2        weeks.

## 2017-08-10 ENCOUNTER — CLINICAL SUPPORT (OUTPATIENT)
Dept: REHABILITATION | Facility: HOSPITAL | Age: 72
End: 2017-08-10
Attending: INTERNAL MEDICINE
Payer: COMMERCIAL

## 2017-08-10 DIAGNOSIS — M25.551 BILATERAL HIP PAIN: Primary | ICD-10-CM

## 2017-08-10 DIAGNOSIS — M25.552 BILATERAL HIP PAIN: Primary | ICD-10-CM

## 2017-08-10 PROCEDURE — 97110 THERAPEUTIC EXERCISES: CPT | Mod: PO

## 2017-08-11 NOTE — PROGRESS NOTES
Physical Therapy Daily Note    Visit: 20    Name: Keiry Rudolph  Clinic Number: [id number]    Keiry is a 71 y.o. female treated on 08/10/2017.     Diagnosis:   Encounter Diagnosis   Name Primary?    Bilateral hip pain Yes       DOS: NA    Physician: Kirk, Provider  Treatment Orders: PT Eval and Treat    No past medical history on file.  No current outpatient prescriptions on file.     No current facility-administered medications for this visit.      Review of patient's allergies indicates:  Allergies not on file  Precautions: Fall  Occupation: retired      Subjective  Onset/JIMMY: gradual  Involved Area/Location: bilateral  Current Symptoms: pain    Increases symptoms: Movement  Decreases Symptoms: Not moving    Current Function: Prolonged walking, standing and climbing stairs  Previous function: I in ADL's    Diagnostic Tests: MRI    Pain Scale: Keiry rates B leg pain to be between 4 - 8 on a scale of 1-10.    Patient Goals: decrease pain and Improve mobility and walk without pain  Pt reported that her left hip feels OK today.  She also stated that she will be out of town for the next two weeks    Objective    Functional Limitations  Reports - G Codes    Category:  Mobility   Tool:  FOTO Outcomes Measurement System.   Score:  Patient scored out 24 of 100 on the FOTO Outcomes Measurement System.   Current:  G 8978 CL   Goal:  G 8979 CK          Observation: Pt was not willing flex her L hip in supine    Posture: Flexed trunk in standing       Passive Range of Motion: Knee   Left Right   Flexion: NT NT   Extension 0 0            Lower Extremity Strength - not tested today secondary to Pt's fear of her L hip pain returning.  Right LE  Left LE    Knee extension: 3+/5 Knee extension: 3+/5   Knee flexion: 2/5 Knee flexion: 2/5   Hip flexion: 2+/5 Hip flexion: 2/5   Hip extension:  NT Hip extension: NT   Hip abduction/Glut Medius: NT  Hip abduction/Glut medius NT   Hip adduction: NT Hip adduction: NT   Hip internal rotation: NT Hip internal rotation: NT   Hip external rotation: NT Hip external rotation: NT   Ankle dorsiflexion: 5/5 Ankle dorsiflexion: 4/5   Ankle plantarflexion: NT Ankle plantarflexion: NT       Special Tests: Knee - NA    Joint Mobility: WNL  Palpation: Not tested today  Sensation: intact to light touch    Edema: none      Gait: Keiry ambulated 100 feet with no assistive device.    Balance: NT    Treatment:  Pt performed there ex's for B LE strengthening, ROM, flexibility and endurance including:  Nu Step x 10 minutes level 7 - set up for Pt   UBE x 10 minutes  B Quad sets 10 x 3 with 3 sec hold - with towel roll  Gluteal sets 10 x 3 with 3 sec holld  B Ankle Dorsiflexion, Plantar flexion, inversion and eversion 10 x 3 each  Supine B hip rotation 10 x 2 - NP  Hip abd isometrics in hook lying with strap around knees 10 x 3 - NP  Abdominal bracing 10 x 3  SAQ 20 x 3 on R & L 2#  Bridges 10 x 3   Hip add/abd isometrics with ball and a strap 10 x 3 w 3 sec hold.  Exercises were reviewed and pt was able to demonstrate them prior to the end of the session.      Treatments not performed today:  Heel slides 10 x 0 on the L and 10 x 3 on the R - with slide board and a towel set up for Pt.  Clams 10 x 3 on R & L with orange TB - NP  LAQ 10 x 3 on R & L with 3# on each - NP  Soft tissue rolling over B anterior and lateral thighs    Ed pt to use ice PRN 10- 20 min when pain or swelling occurs.     Keiry demo good understanding of the education provided. Patient demo good return demo of skill of exercises.      Assessment  This is a 71 y.o. female referred to outpatient physical therapy and presents with a medical diagnosis of   B hip pain and demonstrates limitations as described in the problem list. Pt will benefit from physcial therapy services in order to maximize pain free and/or functional use of bilateral knees and hips. Pt reported  a significant decrease in her L hip pain following a cortisone injection last week.  She is presently hesitant to resume all of her past exercise routine or have the hip re-evaluated secondary to fear that her pain may return.  Pt tolerated hip abd/add isometrics well.  Pt tolerated all exercises well today.  She is not comfortable with attempting hip add or abd exercises.    Problem List: pain, decreased strength, decreased balance and stability and decreased ability to fully participate in recreational/sports related activities.      GOALS: Short Term Goals:  3 weeks  1. Pt will demonstrate increased knee flexion AROM to 120 degrees. - met  2. Pt will demonstrate increased hip flexion AROM to 90 degrees  3. Independent with HEP to increase patient's tolerance for ADL  Function - met    Long Term Goals: 6 weeks  1.Report decreased    B LE    pain  <   / =  2  /10 with walking to increase tolerance for ADL's  2.Increased MMT  for  B LE's  to increase tolerance for ADL and work activities.  3.Increased arom  for  B hips and knees to improve normal movement patterns during ADL's.  4. Pt will report improvement in overall functional abilities of LE, evidenced by improved score on the FOTO knee survey    Plan  Pt will be treated by physical therapy 2 times a week for 6 weeks for Pt Education, HEP, therapeutic exercises, neuromuscular re-education/ balance exercises, joint mobilizations, modalities prn   to achieve established goals. Keiry may at times be seen by a PTA as part of the Rehab Team.  We have requested further authorization for Physical Therapy Tx and are awaiting authorization.    Cont PT for  2        weeks.

## 2017-09-15 ENCOUNTER — CLINICAL SUPPORT (OUTPATIENT)
Dept: REHABILITATION | Facility: HOSPITAL | Age: 72
End: 2017-09-15
Attending: INTERNAL MEDICINE
Payer: COMMERCIAL

## 2017-09-15 DIAGNOSIS — M25.551 BILATERAL HIP PAIN: ICD-10-CM

## 2017-09-15 DIAGNOSIS — M25.552 BILATERAL HIP PAIN: ICD-10-CM

## 2017-09-15 PROCEDURE — 97110 THERAPEUTIC EXERCISES: CPT | Mod: PO

## 2017-09-15 NOTE — PROGRESS NOTES
Physical Therapy Daily Note    Visit: 21    Name: Keiry Rudolph  Clinic Number: [id number]    Keiry is a 71 y.o. female treated on 09/15/2017.     Diagnosis:   Encounter Diagnosis   Name Primary?    Bilateral hip pain        DOS: NA    Physician: Kirk, Provider  Treatment Orders: PT Eval and Treat    No past medical history on file.  No current outpatient prescriptions on file.     No current facility-administered medications for this visit.      Review of patient's allergies indicates:  Allergies not on file  Precautions: Fall  Occupation: retired      Subjective  Onset/JIMMY: gradual  Involved Area/Location: bilateral  Current Symptoms: pain    Increases symptoms: Movement  Decreases Symptoms: Not moving    Current Function: Prolonged walking, standing and climbing stairs  Previous function: I in ADL's    Diagnostic Tests: MRI    Pain Scale: Keiry rates B leg pain to be between 4 - 8 on a scale of 1-10.    Patient Goals: decrease pain and Improve mobility and walk without pain  Pt reported that her left hip was not too bad over the last tow weeks.    Objective    Functional Limitations  Reports - G Codes    Category:  Mobility   Tool:  FOTO Outcomes Measurement System.   Score:  Patient scored out 24 of 100 on the FOTO Outcomes Measurement System.   Current:  G 8978 CL   Goal:  G 8979 CK          Observation: Pt was not willing flex her L hip in supine    Posture: Flexed trunk in standing       Passive Range of Motion: Knee   Left Right   Flexion: NT NT   Extension 0 0            Lower Extremity Strength - not tested today secondary to Pt's fear of her L hip pain returning.  Right LE  Left LE    Knee extension: 3+/5 Knee extension: 3+/5   Knee flexion: 2/5 Knee flexion: 2/5   Hip flexion: 2+/5 Hip flexion: 2/5   Hip extension:  NT Hip extension: NT   Hip abduction/Glut Medius: NT Hip abduction/Glut medius NT   Hip adduction:  NT Hip adduction: NT   Hip internal rotation: NT Hip internal rotation: NT   Hip external rotation: NT Hip external rotation: NT   Ankle dorsiflexion: 5/5 Ankle dorsiflexion: 4/5   Ankle plantarflexion: NT Ankle plantarflexion: NT       Special Tests: Knee - NA    Joint Mobility: WNL  Palpation: Not tested today  Sensation: intact to light touch    Edema: none      Gait: Keiry ambulated 100 feet with no assistive device.    Balance: NT    Treatment:  Pt performed there ex's for B LE strengthening, ROM, flexibility and endurance including:  Nu Step x 10 minutes level 7 - set up for Pt   B Quad sets 10 x 3 with 3 sec hold - with towel roll  B Ankle Dorsiflexion, Plantar flexion, inversion and eversion 10 x 3 each  Supine B hip rotation 10 x 2 - NP  Hip abd/add isometrics in hook lying with strap around and ball between knees 10 x 3   Abdominal bracing 10 x 3  SAQ 20 x 3 on R & L 2#  Bridges 10 x 3   Hip add/abd isometrics with ball and a strap 10 x 3 w 3 sec hold.  Soft tissue rolling over B anterior and lateral thighs  Exercises were reviewed and pt was able to demonstrate them prior to the end of the session.      Treatments not performed today:  UBE x 10 minutes  Heel slides 10 x 0 on the L and 10 x 3 on the R - with slide board and a towel set up for Pt.  Clams 10 x 3 on R & L with orange TB - NP  LAQ 10 x 3 on R & L with 3# on each - NP      Ed pt to use ice PRN 10- 20 min when pain or swelling occurs.     Keiry demo good understanding of the education provided. Patient demo good return demo of skill of exercises.      Assessment  This is a 71 y.o. female referred to outpatient physical therapy and presents with a medical diagnosis of   B hip pain and demonstrates limitations as described in the problem list. Pt will benefit from physcial therapy services in order to maximize pain free and/or functional use of bilateral knees and hips. Pt reported a significant decrease in her L hip pain following a cortisone  injection last week.  She is presently hesitant to resume all of her past exercise routine or have the hip re-evaluated secondary to fear that her pain may return.  Pt tolerated hip abd/add isometrics well.  Pt tolerated all exercises well today.  She is not comfortable with attempting hip add or abd exercises.    Problem List: pain, decreased strength, decreased balance and stability and decreased ability to fully participate in recreational/sports related activities.      GOALS: Short Term Goals:  3 weeks  1. Pt will demonstrate increased knee flexion AROM to 120 degrees. - met  2. Pt will demonstrate increased hip flexion AROM to 90 degrees  3. Independent with HEP to increase patient's tolerance for ADL  Function - met    Long Term Goals: 6 weeks  1.Report decreased    B LE    pain  <   / =  2  /10 with walking to increase tolerance for ADL's  2.Increased MMT  for  B LE's  to increase tolerance for ADL and work activities.  3.Increased arom  for  B hips and knees to improve normal movement patterns during ADL's.  4. Pt will report improvement in overall functional abilities of LE, evidenced by improved score on the FOTO knee survey    Plan  Pt will be treated by physical therapy 2 times a week for 6 weeks for Pt Education, HEP, therapeutic exercises, neuromuscular re-education/ balance exercises, joint mobilizations, modalities prn   to achieve established goals. Keiry may at times be seen by a PTA as part of the Rehab Team.  We have requested further authorization for Physical Therapy Tx and are awaiting authorization.    Cont PT for  2        weeks.

## 2017-09-20 ENCOUNTER — CLINICAL SUPPORT (OUTPATIENT)
Dept: REHABILITATION | Facility: HOSPITAL | Age: 72
End: 2017-09-20
Attending: INTERNAL MEDICINE
Payer: COMMERCIAL

## 2017-09-20 DIAGNOSIS — M25.551 BILATERAL HIP PAIN: Primary | ICD-10-CM

## 2017-09-20 DIAGNOSIS — M25.552 BILATERAL HIP PAIN: Primary | ICD-10-CM

## 2017-09-20 PROCEDURE — 97110 THERAPEUTIC EXERCISES: CPT | Mod: PO

## 2017-09-20 NOTE — PROGRESS NOTES
Physical Therapy Daily Note     Name: Keiry Rudolph  Clinic Number: [id number]  Diagnosis:   Encounter Diagnosis   Name Primary?    Bilateral hip pain Yes     Physician: Josemanuel Duggan MD   Visit #: 22   Time In: 3:00  Time Out: 4:00  treatment time: 50   1 on 1 time: 30      Subjective     Pt reports: chronic (B) hip and knee pain. L worse than (R. She states that she is need of a hip replacement but wants to wait as long as she can. She states that the ex's keep her active.  Pain Scale: Keiry rates pain on a scale of 0-10 to be 7 currently.    Objective   Patient arrives on wrong Tx day, arrangements made for patient to be seen.    Keiry received individual therapeutic exercises to develop LE/Corestrength, endurance, ROM, flexibility and core stabilization for  50 minutes including:  Nu Step x 10 minutes level 7 - set up for Pt   B Quad sets 10 x 3 with 3 sec hold - with towel roll  B Ankle Dorsiflexion, Plantar flexion, inversion and eversion 10 x 3 each  Supine B hip rotation 10 x 2 - NP  Hip abd/add isometrics in hook lying with strap around and ball between knees 10 x 3   Abdominal bracing 10 x 3  SAQ 20 x 3 on R & L 2.5#  Bridges 10 x 3   Hip add/abd isometrics with ball and a strap 10 x 3 w 3 sec hold.  Soft tissue rolling over B anterior and lateral thighs        Patient education: Patient educated to continue with previously issued HEP to tolerance with good understanding.   No spiritual or educational barriers to learning     Assessment     Patient able to perform the above ex's/activities without increased symptoms. She is reluctant to perform AROM clamshells due to fear of increased pain but able to perform isometric Hip add/abd without c/o.  Chronic pain is still lingering although she feels ex's are helpful. Pain level remained in the moderate range post Tx   This is a 71 y.o. female referred to outpatient physical therapy and presents  with a medical diagnosis of   Encounter Diagnosis   Name Primary?    Bilateral hip pain Yes    and demonstrates limitations as described in the problem list.   Pt will continue to benefit from skilled outpatient physical therapy to address the deficits listed in the problem list, provide pt/family education and to maximize pt's level of independence in the home and community environment.     GOALS: Short Term Goals:    1. Pt will demonstrate increased knee flexion AROM to 120 degrees. - met  2. Pt will demonstrate increased hip flexion AROM to 90 degrees  3. Independent with HEP to increase patient's tolerance for ADL  Function - met     Long Term Goals:    1.Report decreased    B LE    pain  <   / =  2  /10 with walking to increase tolerance for ADL's  2.Increased MMT  for  B LE's  to increase tolerance for ADL and work activities.  3.Increased arom  for  B hips and knees to improve normal movement patterns during ADL's.  4. Pt will report improvement in overall functional abilities of LE, evidenced by improved score on the FOTO knee survey     Plan     Continue with established Plan of Care towards PT goals.    Therapist: Donny Win, PTA  9/20/2017

## 2017-09-27 ENCOUNTER — CLINICAL SUPPORT (OUTPATIENT)
Dept: REHABILITATION | Facility: HOSPITAL | Age: 72
End: 2017-09-27
Attending: INTERNAL MEDICINE
Payer: COMMERCIAL

## 2017-09-27 DIAGNOSIS — M25.551 BILATERAL HIP PAIN: ICD-10-CM

## 2017-09-27 DIAGNOSIS — M25.552 BILATERAL HIP PAIN: ICD-10-CM

## 2017-09-27 PROCEDURE — 97110 THERAPEUTIC EXERCISES: CPT | Mod: PO

## 2017-09-27 NOTE — PROGRESS NOTES
Physical Therapy Daily Note     Name: Keiry Rudolph  Clinic Number: [id number]  Diagnosis:   Encounter Diagnosis   Name Primary?    Bilateral hip pain      Physician: Josemanuel Duggan MD   Visit #: 22   Time In: 3:00  Time Out: 4:00  treatment time: 50   1 on 1 time: 30      Subjective     Pt reports: Continues to report L hip pain with pivoting movements.  She also stated that she will be out of town for the next two weeks  Pain Scale: Keiry rates pain on a scale of 0-10 to be 5 in the L hip currently.    Objective       Keiry received individual therapeutic exercises to develop LE/Corestrength, endurance, ROM, flexibility and core stabilization for  50 minutes including:  Nu Step x 10 minutes level 7 - set up for Pt   B Quad sets 10 x 3 with 3 sec hold - with towel roll  B Ankle Dorsiflexion, Plantar flexion, inversion and eversion 10 x 3 each  Supine B hip rotation 10 x 2 - NP  Hip abd/add isometrics in hook lying with strap around and ball between knees 10 x 3   Abdominal bracing 10 x 3  SAQ 20 x 3 on R & L 2.5#  Bridges 10 x 3   Soft tissue rolling over B anterior and lateral thighs        Patient education: Patient educated to continue with previously issued HEP to tolerance with good understanding.   No spiritual or educational barriers to learning     Assessment     Patient able to perform the above ex's/activities without increased symptoms today.  She remains guarded with L hip movements with exercise due to fear of increasing her L hip pain.  This is a 71 y.o. female referred to outpatient physical therapy and presents with a medical diagnosis of   Encounter Diagnosis   Name Primary?    Bilateral hip pain Yes    and demonstrates limitations as described in the problem list.   Pt will continue to benefit from skilled outpatient physical therapy to address the deficits listed in the problem list, provide pt/family education and to maximize pt's  level of independence in the home and community environment.     GOALS: Short Term Goals:    1. Pt will demonstrate increased knee flexion AROM to 120 degrees. - met  2. Pt will demonstrate increased hip flexion AROM to 90 degrees  3. Independent with HEP to increase patient's tolerance for ADL  Function - met     Long Term Goals:    1.Report decreased    B LE    pain  <   / =  2  /10 with walking to increase tolerance for ADL's  2.Increased MMT  for  B LE's  to increase tolerance for ADL and work activities.  3.Increased arom  for  B hips and knees to improve normal movement patterns during ADL's.  4. Pt will report improvement in overall functional abilities of LE, evidenced by improved score on the FOTO knee survey     Plan     Continue with established Plan of Care towards PT goals.    Therapist: Louie Davies, PT  9/27/2017

## 2017-10-17 ENCOUNTER — CLINICAL SUPPORT (OUTPATIENT)
Dept: REHABILITATION | Facility: HOSPITAL | Age: 72
End: 2017-10-17
Attending: INTERNAL MEDICINE
Payer: COMMERCIAL

## 2017-10-17 DIAGNOSIS — M25.552 BILATERAL HIP PAIN: ICD-10-CM

## 2017-10-17 DIAGNOSIS — M25.551 BILATERAL HIP PAIN: ICD-10-CM

## 2017-10-17 NOTE — PROGRESS NOTES
Physical Therapy Daily Note     Name: Keiry Rudolph  Clinic Number: [id number]  Diagnosis:   Encounter Diagnosis   Name Primary?    Bilateral hip pain      Physician: Josemanuel Duggan MD   Visit #: 22   Time In: 3:00  Time Out: 4:00  treatment time: 50   1 on 1 time: 30      Subjective     Pt reports: Continues to report L hip pain with pivoting movements.  She also stated that she will be out of town for the next two weeks  Pain Scale: Keiry rates pain on a scale of 0-10 to be 5 in the L hip currently.    Objective       Keiry received individual therapeutic exercises to develop LE/Corestrength, endurance, ROM, flexibility and core stabilization for  50 minutes including:  Nu Step x 10 minutes level 7 - set up for Pt   B Quad sets 10 x 3 with 3 sec hold - with towel roll  B Ankle Dorsiflexion, Plantar flexion, inversion and eversion 10 x 3 each  Supine B hip rotation 10 x 2 - NP  Hip abd/add isometrics in hook lying with strap around and ball between knees 10 x 3   Abdominal bracing 10 x 3  SAQ 20 x 3 on R & L 2.5#  Bridges 10 x 3   Soft tissue rolling over B anterior and lateral thighs        Patient education: Patient educated to continue with previously issued HEP to tolerance with good understanding.   No spiritual or educational barriers to learning     Assessment     Patient able to perform the above ex's/activities without increased symptoms today.  She remains guarded with L hip movements with exercise due to fear of increasing her L hip pain.  This is a 71 y.o. female referred to outpatient physical therapy and presents with a medical diagnosis of   Encounter Diagnosis   Name Primary?    Bilateral hip pain Yes    and demonstrates limitations as described in the problem list.   Pt will continue to benefit from skilled outpatient physical therapy to address the deficits listed in the problem list, provide pt/family education and to maximize pt's  level of independence in the home and community environment.     GOALS: Short Term Goals:    1. Pt will demonstrate increased knee flexion AROM to 120 degrees. - met  2. Pt will demonstrate increased hip flexion AROM to 90 degrees  3. Independent with HEP to increase patient's tolerance for ADL  Function - met     Long Term Goals:    1.Report decreased    B LE    pain  <   / =  2  /10 with walking to increase tolerance for ADL's  2.Increased MMT  for  B LE's  to increase tolerance for ADL and work activities.  3.Increased arom  for  B hips and knees to improve normal movement patterns during ADL's.  4. Pt will report improvement in overall functional abilities of LE, evidenced by improved score on the FOTO knee survey     Plan     Continue with established Plan of Care towards PT goals.    Therapist: Louie Davies, PT  10/17/2017

## 2017-10-19 ENCOUNTER — CLINICAL SUPPORT (OUTPATIENT)
Dept: REHABILITATION | Facility: HOSPITAL | Age: 72
End: 2017-10-19
Attending: INTERNAL MEDICINE
Payer: COMMERCIAL

## 2017-10-19 DIAGNOSIS — M25.551 BILATERAL HIP PAIN: ICD-10-CM

## 2017-10-19 DIAGNOSIS — M25.552 BILATERAL HIP PAIN: ICD-10-CM

## 2017-10-19 PROCEDURE — 97110 THERAPEUTIC EXERCISES: CPT | Mod: PO

## 2017-10-20 NOTE — PROGRESS NOTES
Physical Therapy Daily Note     Name: Keiry Rudolph  Clinic Number: [id number]  Diagnosis:   Encounter Diagnosis   Name Primary?    Bilateral hip pain      Physician: Kirk, Provider   Visit #: 22   Time In: 3:00  Time Out: 4:00  treatment time: 50   1 on 1 time: 30      Subjective     Pt reports: Pt reported that she feels as if she is moving a little better  Pain Scale: Keiry rates pain on a scale of 0-10 to be 5 in the L hip currently.    Objective       Keiry received individual therapeutic exercises to develop LE/Corestrength, endurance, ROM, flexibility and core stabilization for  50 minutes including:  Nu Step x 10 minutes level 7 - set up for Pt   B Quad sets 10 x 3 with 3 sec hold - with towel roll  B Ankle Dorsiflexion, Plantar flexion, inversion and eversion 10 x 3 each  Supine B hip rotation 10 x 2   Hip abd/add isometrics in hook lying with strap around and ball between knees 10 x 3   Abdominal bracing 10 x 3  SAQ 20 x 3 on R & L 2.5#  Bridges 10 x 3   Soft tissue rolling over B anterior and lateral thighs - np        Patient education: Patient educated to continue with previously issued HEP to tolerance with good understanding.   No spiritual or educational barriers to learning     Assessment     Patient tolerated Tx well with reports of increased mobility.  She remains guarded with L hip movements with exercise due to fear of increasing her L hip pain.  This is a 71 y.o. female referred to outpatient physical therapy and presents with a medical diagnosis of   Encounter Diagnosis   Name Primary?    Bilateral hip pain Yes    and demonstrates limitations as described in the problem list.   Pt will continue to benefit from skilled outpatient physical therapy to address the deficits listed in the problem list, provide pt/family education and to maximize pt's level of independence in the home and community environment.     GOALS: Short Term  Goals:    1. Pt will demonstrate increased knee flexion AROM to 120 degrees. - met  2. Pt will demonstrate increased hip flexion AROM to 90 degrees  3. Independent with HEP to increase patient's tolerance for ADL  Function - met     Long Term Goals:    1.Report decreased    B LE    pain  <   / =  2  /10 with walking to increase tolerance for ADL's  2.Increased MMT  for  B LE's  to increase tolerance for ADL and work activities.  3.Increased arom  for  B hips and knees to improve normal movement patterns during ADL's.  4. Pt will report improvement in overall functional abilities of LE, evidenced by improved score on the FOTO knee survey     Plan     Continue with established Plan of Care towards PT goals.    Therapist: Louie Davies, PT  10/19/2017

## 2017-10-26 ENCOUNTER — CLINICAL SUPPORT (OUTPATIENT)
Dept: REHABILITATION | Facility: HOSPITAL | Age: 72
End: 2017-10-26
Attending: INTERNAL MEDICINE
Payer: COMMERCIAL

## 2017-10-26 DIAGNOSIS — M25.552 BILATERAL HIP PAIN: ICD-10-CM

## 2017-10-26 DIAGNOSIS — M25.551 BILATERAL HIP PAIN: ICD-10-CM

## 2017-10-27 NOTE — PROGRESS NOTES
Physical Therapy Daily Note     Name: Keiry Rudolph  Clinic Number: [id number]  Diagnosis:   Encounter Diagnosis   Name Primary?    Bilateral hip pain      Physician: Kirk, Provider   Visit #: 22   Time In: 3:00  Time Out: 4:00  treatment time: 50   1 on 1 time: 30      Subjective     Pt reports: Pt reported that she feels as if she is moving a little better  Pain Scale: Keiry rates pain on a scale of 0-10 to be 5 in the L hip currently.    Objective       Keiry received individual therapeutic exercises to develop LE/Corestrength, endurance, ROM, flexibility and core stabilization for  50 minutes including:  Nu Step x 10 minutes level 7 - set up for Pt   B Quad sets 10 x 3 with 3 sec hold - with towel roll  B Ankle Dorsiflexion, Plantar flexion, inversion and eversion 10 x 3 each  Supine B hip rotation 10 x 2   Hip abd/add isometrics in hook lying with strap around and ball between knees 10 x 3   Abdominal bracing 10 x 3  SAQ 20 x 3 on R & L 2.5#  Bridges 10 x 3   Soft tissue rolling over B anterior and lateral thighs - np        Patient education: Patient educated to continue with previously issued HEP to tolerance with good understanding.   No spiritual or educational barriers to learning     Assessment     Patient tolerated Tx well with reports of increased mobility.  She remains guarded with L hip movements with exercise due to fear of increasing her L hip pain.  This is a 71 y.o. female referred to outpatient physical therapy and presents with a medical diagnosis of   Encounter Diagnosis   Name Primary?    Bilateral hip pain Yes    and demonstrates limitations as described in the problem list.   Pt will continue to benefit from skilled outpatient physical therapy to address the deficits listed in the problem list, provide pt/family education and to maximize pt's level of independence in the home and community environment.     GOALS: Short Term  Goals:    1. Pt will demonstrate increased knee flexion AROM to 120 degrees. - met  2. Pt will demonstrate increased hip flexion AROM to 90 degrees  3. Independent with HEP to increase patient's tolerance for ADL  Function - met     Long Term Goals:    1.Report decreased    B LE    pain  <   / =  2  /10 with walking to increase tolerance for ADL's  2.Increased MMT  for  B LE's  to increase tolerance for ADL and work activities.  3.Increased arom  for  B hips and knees to improve normal movement patterns during ADL's.  4. Pt will report improvement in overall functional abilities of LE, evidenced by improved score on the FOTO knee survey     Plan     Continue with established Plan of Care towards PT goals.    Therapist: Louie Davies, PT  10/26/2017

## 2017-11-01 ENCOUNTER — CLINICAL SUPPORT (OUTPATIENT)
Dept: REHABILITATION | Facility: HOSPITAL | Age: 72
End: 2017-11-01
Attending: INTERNAL MEDICINE
Payer: COMMERCIAL

## 2017-11-01 DIAGNOSIS — M25.552 BILATERAL HIP PAIN: Primary | ICD-10-CM

## 2017-11-01 DIAGNOSIS — M25.551 BILATERAL HIP PAIN: Primary | ICD-10-CM

## 2017-11-01 PROCEDURE — 97110 THERAPEUTIC EXERCISES: CPT | Mod: PO

## 2017-11-03 ENCOUNTER — CLINICAL SUPPORT (OUTPATIENT)
Dept: REHABILITATION | Facility: HOSPITAL | Age: 72
End: 2017-11-03
Attending: INTERNAL MEDICINE
Payer: COMMERCIAL

## 2017-11-03 DIAGNOSIS — M25.551 BILATERAL HIP PAIN: ICD-10-CM

## 2017-11-03 DIAGNOSIS — M25.552 BILATERAL HIP PAIN: ICD-10-CM

## 2017-11-03 PROCEDURE — 97110 THERAPEUTIC EXERCISES: CPT | Mod: PO

## 2017-11-03 NOTE — PROGRESS NOTES
Physical Therapy Daily Note     Name: Keiry Rudolph  Clinic Number: [id number]  Diagnosis:   Encounter Diagnosis   Name Primary?    Bilateral hip pain      Physician: Josemanuel Duggan MD   Visit #: 23   Time In: 2:00  Time Out: 3:00  treatment time: 50   1 on 1 time: 30      Subjective     Pt reports: Pt reported that her L hip and LE is a little sore today  Pain Scale: Keiry rates pain on a scale of 0-10 to be 5 in the L hip currently.    Objective       Keiry received individual therapeutic exercises to develop LE/Corestrength, endurance, ROM, flexibility and core stabilization for  50 minutes including:  Nu Step x 10 minutes level 7 - set up for Pt   B Quad sets 10 x 3 with 3 sec hold   B Ankle Dorsiflexion, Plantar flexion, inversion and eversion 10 x 3 each  Supine B hip rotation 10 x  - np  Hip abd/add isometrics in hook lying with strap around and ball between knees 10 x 3   Abdominal bracing 10 x 3  SAQ 20 x 3 on R & L 2.5#  Bridges 10 x 3   Soft tissue rolling over B anterior and lateral thighs   UBE x 10 min     Patient education: Patient educated to continue with previously issued HEP to tolerance with good understanding.   No spiritual or educational barriers to learning     Assessment     Patient tolerated Tx well with reports some increased pain in her L LE today.  She remains guarded with L hip movements with exercise due to fear of increasing her L hip pain.  This is a 71 y.o. female referred to outpatient physical therapy and presents with a medical diagnosis of   Encounter Diagnosis   Name Primary?    Bilateral hip pain Yes    and demonstrates limitations as described in the problem list.   Pt will continue to benefit from skilled outpatient physical therapy to address the deficits listed in the problem list, provide pt/family education and to maximize pt's level of independence in the home and community environment.     GOALS: Short  Term Goals:    1. Pt will demonstrate increased knee flexion AROM to 120 degrees. - met  2. Pt will demonstrate increased hip flexion AROM to 90 degrees  3. Independent with HEP to increase patient's tolerance for ADL  Function - met     Long Term Goals:    1.Report decreased    B LE    pain  <   / =  2  /10 with walking to increase tolerance for ADL's  2.Increased MMT  for  B LE's  to increase tolerance for ADL and work activities.  3.Increased arom  for  B hips and knees to improve normal movement patterns during ADL's.  4. Pt will report improvement in overall functional abilities of LE, evidenced by improved score on the FOTO knee survey     Plan     Continue with established Plan of Care towards PT goals.    Therapist: Louie Davies, PT  11/3/2017

## 2017-11-07 ENCOUNTER — CLINICAL SUPPORT (OUTPATIENT)
Dept: REHABILITATION | Facility: HOSPITAL | Age: 72
End: 2017-11-07
Attending: INTERNAL MEDICINE
Payer: COMMERCIAL

## 2017-11-07 DIAGNOSIS — M25.551 BILATERAL HIP PAIN: Primary | ICD-10-CM

## 2017-11-07 DIAGNOSIS — M25.552 BILATERAL HIP PAIN: Primary | ICD-10-CM

## 2017-11-07 PROCEDURE — 97110 THERAPEUTIC EXERCISES: CPT | Mod: PO

## 2017-12-26 NOTE — PROGRESS NOTES
Physical Therapy Daily Note     Name: Keiry Rudolph  Clinic Number: [id number]  Diagnosis:   Encounter Diagnosis   Name Primary?    Bilateral hip pain Yes     Physician: Referring, Unknown   Visit #: 22   Time In: 2:00  Time Out: 3:00  treatment time: 50   1 on 1 time: 30      Subjective     Pt reports: Pt reports that she is feeling a little stronger  Pain Scale: Keiry rates pain on a scale of 0-10 to be 5 in the L hip currently.    Objective       Keiry received individual therapeutic exercises to develop LE/Corestrength, endurance, ROM, flexibility and core stabilization for  50 minutes including:  Nu Step x 10 minutes level 7 - set up for Pt   B Quad sets 10 x 3 with 3 sec hold - with towel roll  B Ankle Dorsiflexion, Plantar flexion, inversion and eversion 10 x 3 each  Supine B hip rotation 10 x 2   Hip abd/add isometrics in hook lying with strap around and ball between knees 10 x 3   Abdominal bracing 10 x 3  SAQ 20 x 3 on R & L 2.5#  Bridges 10 x 3   Soft tissue rolling over B anterior and lateral thighs - np        Patient education: Patient educated to continue with previously issued HEP to tolerance with good understanding.   No spiritual or educational barriers to learning     Assessment     Patient tolerated Tx well with reports of increased mobility.  She remains guarded with L hip movements with exercise due to fear of increasing her L hip pain.  This is a 72 y.o. female referred to outpatient physical therapy and presents with a medical diagnosis of   Encounter Diagnosis   Name Primary?    Bilateral hip pain Yes    and demonstrates limitations as described in the problem list.   Pt will continue to benefit from skilled outpatient physical therapy to address the deficits listed in the problem list, provide pt/family education and to maximize pt's level of independence in the home and community environment.     GOALS: Short Term Goals:    1.  Pt will demonstrate increased knee flexion AROM to 120 degrees. - met  2. Pt will demonstrate increased hip flexion AROM to 90 degrees  3. Independent with HEP to increase patient's tolerance for ADL  Function - met     Long Term Goals:    1.Report decreased    B LE    pain  <   / =  2  /10 with walking to increase tolerance for ADL's  2.Increased MMT  for  B LE's  to increase tolerance for ADL and work activities.  3.Increased arom  for  B hips and knees to improve normal movement patterns during ADL's.  4. Pt will report improvement in overall functional abilities of LE, evidenced by improved score on the FOTO knee survey     Plan     Continue with established Plan of Care towards PT goals.    Therapist: Louie Davies, PT  12/26/2017

## 2017-12-26 NOTE — PROGRESS NOTES
Physical Therapy Daily Note     Name: Keiry Rudolph  Clinic Number: [id number]  Diagnosis:   Encounter Diagnosis   Name Primary?    Bilateral hip pain Yes     Physician: Referring, Unknown   Visit #: 23   Time In: 2:00  Time Out: 3:00  treatment time: 50   1 on 1 time: 30      Subjective     Pt reports: Pt reported that her L hip and LE is not feeling too bad today  Pain Scale: Keiry rates pain on a scale of 0-10 to be 5 in the L hip currently.    Objective       Keiry received individual therapeutic exercises to develop LE/Corestrength, endurance, ROM, flexibility and core stabilization for  50 minutes including:  Nu Step x 10 minutes level 7 - set up for Pt   B Quad sets 10 x 3 with 3 sec hold   B Ankle Dorsiflexion, Plantar flexion, inversion and eversion 10 x 3 each  Supine B hip rotation 10 x  - np  Hip abd/add isometrics in hook lying with strap around and ball between knees 10 x 3   Abdominal bracing 10 x 3  SAQ 20 x 3 on R & L 2.5#  Bridges 10 x 3   Soft tissue rolling over B anterior and lateral thighs   UBE x 10 min     Patient education: Patient educated to continue with previously issued HEP to tolerance with good understanding.   No spiritual or educational barriers to learning     Assessment     Patient tolerated Tx well with reports some increased pain in her L LE today.  She remains guarded with L hip movements with exercise due to fear of increasing her L hip pain.  This is a 72 y.o. female referred to outpatient physical therapy and presents with a medical diagnosis of   Encounter Diagnosis   Name Primary?    Bilateral hip pain Yes    and demonstrates limitations as described in the problem list.   Pt will continue to benefit from skilled outpatient physical therapy to address the deficits listed in the problem list, provide pt/family education and to maximize pt's level of independence in the home and community environment.     GOALS:  Short Term Goals:    1. Pt will demonstrate increased knee flexion AROM to 120 degrees. - met  2. Pt will demonstrate increased hip flexion AROM to 90 degrees  3. Independent with HEP to increase patient's tolerance for ADL  Function - met     Long Term Goals:    1.Report decreased    B LE    pain  <   / =  2  /10 with walking to increase tolerance for ADL's  2.Increased MMT  for  B LE's  to increase tolerance for ADL and work activities.  3.Increased arom  for  B hips and knees to improve normal movement patterns during ADL's.  4. Pt will report improvement in overall functional abilities of LE, evidenced by improved score on the FOTO knee survey     Plan     Continue with established Plan of Care towards PT goals.    Therapist: Louie Davies, PT  12/26/2017

## 2018-02-12 DIAGNOSIS — M76.62 ACHILLES TENDINITIS OF LEFT LOWER EXTREMITY: Primary | ICD-10-CM

## 2018-02-14 ENCOUNTER — CLINICAL SUPPORT (OUTPATIENT)
Dept: REHABILITATION | Facility: HOSPITAL | Age: 73
End: 2018-02-14
Attending: INTERNAL MEDICINE
Payer: COMMERCIAL

## 2018-02-14 DIAGNOSIS — M76.61 ACHILLES TENDINITIS OF RIGHT LOWER EXTREMITY: ICD-10-CM

## 2018-02-14 PROBLEM — M76.60 ACHILLES TENDONITIS: Status: ACTIVE | Noted: 2018-02-14

## 2018-02-14 PROCEDURE — G8979 MOBILITY GOAL STATUS: HCPCS | Mod: CK,PO

## 2018-02-14 PROCEDURE — 97014 ELECTRIC STIMULATION THERAPY: CPT | Mod: PO

## 2018-02-14 PROCEDURE — G8978 MOBILITY CURRENT STATUS: HCPCS | Mod: CM,PO

## 2018-02-14 PROCEDURE — 97161 PT EVAL LOW COMPLEX 20 MIN: CPT | Mod: PO

## 2018-02-14 NOTE — PROGRESS NOTES
Physical Therapy Evaluation    Visit: 1    Name: Keiry Rudolph  Clinic Number: [id number]    Keiry is a 72 y.o. female evaluated on 02/14/2018.     Diagnosis:   Encounter Diagnosis   Name Primary?    Achilles tendinitis of right lower extremity        DOS: none    Physician: Donny Interiano MD  Treatment Orders: PT Eval and Treat    No past medical history on file.  No current outpatient prescriptions on file.     No current facility-administered medications for this visit.      Review of patient's allergies indicates:  Allergies not on file  Precautions: Standard  Occupation:       Subjective  Onset/JIMMY: sudden.  Pt woke with pain in her R heel when she got out of bed  Involved Area/Location: right  Current Symptoms: pain    Increases symptoms: walking  Decreases Symptoms: rest    Current Function: Pain in R heel with walking  Previous function: Walking pain free    Diagnostic Tests: NA    Pain Scale: Keiry rates R distal achilles pain to be 9  Patient Goals: decrease pain and ambulate without pain      Objective    Functional Limitations  Reports - G Codes    Category:  Mobility   Tool:  FOTO Outcomes Measurement System.   Score:  Patient scored out 10 of 100 on the FOTO Outcomes Measurement System.   Current:  G 8978 CM   Goal:  G 8979 CK           Observation: Pt with antalgic gait and mild swelling in R ankle    Posture: WNL      Passive Range of Motion: Ankle   Left Right   Dorsiflexion: NT 28   Plantar flexion NT 52            Lower Extremity Strength  Right LE  Left LE    Knee extension: NT Knee extension: NT   Knee flexion: NT Knee flexion: NT   Hip flexion: NT Hip flexion: NT   Hip extension:  NT Hip extension: NT   Hip abduction/Glut Medius: NT Hip abduction/Glut medius NT   Hip adduction: NT Hip adduction: NT   Hip internal rotation: NT Hip internal rotation: NT   Hip external rotation: NT Hip external  rotation: NT   Ankle dorsiflexion:   4/5 Ankle dorsiflexion:   4+/5   Ankle plantarflexion: NT Ankle plantarflexion: NT       Special Tests: Knee    Joint Mobility: hypomobile  Palpation:  Pt is TTP in the R achilles and toe flexor tendons as well as the general calf muscle on the R  Sensation: intact to light touch    Edema: min R ankle      Gait: Keiry ambulated 200 feet with no assistive device and decreased toe off on the R    Balance: NT    PT Evaluation Completed? Yes  Discussed Plan of Care with patient: Yes    Treatment:  Pt performed there ex's for R lower leg strengthening, ROM, flexibility and endurance including:  MH with pre-modulated ES to the R achilles tendon    Exercises were reviewed and pt was able to demonstrate them prior to the end of the session.     Ed pt to use ice PRN 10- 20 min when pain or swelling occurs.     Keiry demo good understanding of the education provided. Patient demo good return demo of skill of exercises.      Assessment  This is a 72 y.o. female referred to outpatient physical therapy and presents with a medical diagnosis of   Encounter Diagnosis   Name Primary?    Achilles tendinitis of right lower extremity     and demonstrates limitations as described in the problem list. Pt will benefit from physcial therapy services in order to maximize pain free and/or functional use of right ankle. The following goals were discussed with the patient and patient is in agreement with them as to be addressed in the treatment plan.     Problem List: pain, decreased ROM, decreased flexibility, decreased strength, decreased motor control, antalgic gait and decreased ability to fully participate in recreational/sports related activities.      GOALS: Short Term Goals:  4 weeks  1. Pt will demonstrate increased ankle dorsiflexion AROM to 20 degrees.  2. Pt will demonstrate increased ankle plantarflexion AROM to 56 degrees.  3. Independent with HEP to increase patient's tolerance for  ambulating    Long Term Goals: 8 weeks  1.Report decreased R achilles and calf pain  <   / =  2  /10 with walkming to increase tolerance for ADL's  2.Increased MMT  for  R LE  to increase tolerance for ADL activities.  3.Increased arom  for  R foot and ankle to improve normal movement patterns during ADL's.  4. Pt will report improvement in overall functional abilities of LE, evidenced by improved score on FOTO survey    Plan  Pt will be treated by physical therapy 2-3 times a week for 8 weeks for Pt Education, HEP, therapeutic exercises, neuromuscular re-education/ balance exercises, joint mobilizations, modalities prn   to achieve established goals. Keiry may at times be seen by a PTA as part of the Rehab Team.     Cont PT for  8         weeks.   I certify the need for these services furnished under this plan of treatment and while under my care.______________________________ Physician/Referring Practitioner  Date of Signature

## 2018-02-16 ENCOUNTER — CLINICAL SUPPORT (OUTPATIENT)
Dept: REHABILITATION | Facility: HOSPITAL | Age: 73
End: 2018-02-16
Attending: INTERNAL MEDICINE
Payer: COMMERCIAL

## 2018-02-16 DIAGNOSIS — M76.61 ACHILLES TENDINITIS OF RIGHT LOWER EXTREMITY: ICD-10-CM

## 2018-02-20 ENCOUNTER — CLINICAL SUPPORT (OUTPATIENT)
Dept: REHABILITATION | Facility: HOSPITAL | Age: 73
End: 2018-02-20
Attending: INTERNAL MEDICINE
Payer: COMMERCIAL

## 2018-02-20 DIAGNOSIS — M76.61 ACHILLES TENDINITIS OF RIGHT LOWER EXTREMITY: ICD-10-CM

## 2018-02-22 ENCOUNTER — CLINICAL SUPPORT (OUTPATIENT)
Dept: REHABILITATION | Facility: HOSPITAL | Age: 73
End: 2018-02-22
Attending: INTERNAL MEDICINE
Payer: COMMERCIAL

## 2018-02-22 DIAGNOSIS — M76.60 ACHILLES TENDINITIS, UNSPECIFIED LATERALITY: ICD-10-CM

## 2018-02-22 PROCEDURE — 97140 MANUAL THERAPY 1/> REGIONS: CPT | Mod: PO

## 2018-02-22 PROCEDURE — 97014 ELECTRIC STIMULATION THERAPY: CPT | Mod: PO

## 2018-02-23 ENCOUNTER — CLINICAL SUPPORT (OUTPATIENT)
Dept: REHABILITATION | Facility: HOSPITAL | Age: 73
End: 2018-02-23
Attending: INTERNAL MEDICINE
Payer: COMMERCIAL

## 2018-02-23 DIAGNOSIS — M76.61 ACHILLES TENDINITIS OF RIGHT LOWER EXTREMITY: ICD-10-CM

## 2018-02-23 NOTE — PROGRESS NOTES
Physical Therapy Daily Note  Visit: 3    Name: Keiry Rudolph  Clinic Number: [id number]    Keiry is a 72 y.o. female evaluated on 02/22/2018.     Diagnosis:   Encounter Diagnosis   Name Primary?    Achilles tendinitis, unspecified laterality        DOS: none    Physician: Donny Interiano MD  Treatment Orders: PT Eval and Treat    No past medical history on file.  No current outpatient prescriptions on file.     No current facility-administered medications for this visit.      Review of patient's allergies indicates:  Allergies not on file  Precautions: Standard  Occupation:       Subjective  Onset/JIMMY: sudden.  Pt woke with pain in her R heel when she got out of bed  Involved Area/Location: right  Current Symptoms: pain    Increases symptoms: walking  Decreases Symptoms: rest    Current Function: Pain in R heel with walking  Previous function: Walking pain free    Diagnostic Tests: NA    Pain Scale: Keiry rates R distal achilles pain to be 9  Patient Goals: decrease pain and ambulate without pain  Pt reported that her achilles has been very sore.    Objective    Functional Limitations  Reports - G Codes    Category:  Mobility   Tool:  FOTO Outcomes Measurement System.   Score:  Patient scored out 10 of 100 on the FOTO Outcomes Measurement System.   Current:  G 8978 CM   Goal:  G 8979 CK           Observation: Pt with antalgic gait and mild swelling in R ankle    Posture: WNL      Passive Range of Motion: Ankle   Left Right   Dorsiflexion: NT 28   Plantar flexion NT 52            Lower Extremity Strength  Right LE  Left LE    Knee extension: NT Knee extension: NT   Knee flexion: NT Knee flexion: NT   Hip flexion: NT Hip flexion: NT   Hip extension:  NT Hip extension: NT   Hip abduction/Glut Medius: NT Hip abduction/Glut medius NT   Hip adduction: NT Hip adduction: NT   Hip internal rotation: NT Hip internal rotation: NT    Hip external rotation: NT Hip external rotation: NT   Ankle dorsiflexion:   4/5 Ankle dorsiflexion:   4+/5   Ankle plantarflexion: NT Ankle plantarflexion: NT       Special Tests: Knee    Joint Mobility: hypomobile  Palpation:  Pt is TTP in the R achilles and toe flexor tendons as well as the general calf muscle on the R  Sensation: intact to light touch    Edema: min R ankle      Gait: Keiry ambulated 200 feet with no assistive device and decreased toe off on the R    Balance: NT    PT Evaluation Completed? Yes  Discussed Plan of Care with patient: Yes    Treatment:  Pt performed there ex's for R lower leg strengthening, ROM, flexibility and endurance including:  MH with pre-modulated ES to the R achilles tendon x 15 min    Manual Therapy x 30 min  Soft tissue mobilization L calf  Dry needling L  Sural  Tibial   Local achilles points    Exercises were reviewed and pt was able to demonstrate them prior to the end of the session.     Ed pt to use ice PRN 10- 20 min when pain or swelling occurs.     Keiry demo good understanding of the education provided. Patient demo good return demo of skill of exercises.      Assessment  This is a 72 y.o. female referred to outpatient physical therapy and presents with a medical diagnosis of   Encounter Diagnosis   Name Primary?    Achilles tendinitis, unspecified laterality     and demonstrates limitations as described in the problem list. Pt will benefit from physcial therapy services in order to maximize pain free and/or functional use of right ankle. The following goals were discussed with the patient and patient is in agreement with them as to be addressed in the treatment plan.     Problem List: pain, decreased ROM, decreased flexibility, decreased strength, decreased motor control, antalgic gait and decreased ability to fully participate in recreational/sports related activities.      GOALS: Short Term Goals:  4 weeks  1. Pt will demonstrate increased ankle dorsiflexion  AROM to 20 degrees.  2. Pt will demonstrate increased ankle plantarflexion AROM to 56 degrees.  3. Independent with HEP to increase patient's tolerance for ambulating    Long Term Goals: 8 weeks  1.Report decreased R achilles and calf pain  <   / =  2  /10 with walkming to increase tolerance for ADL's  2.Increased MMT  for  R LE  to increase tolerance for ADL activities.  3.Increased arom  for  R foot and ankle to improve normal movement patterns during ADL's.  4. Pt will report improvement in overall functional abilities of LE, evidenced by improved score on FOTO survey    Plan  Pt will be treated by physical therapy 2-3 times a week for 8 weeks for Pt Education, HEP, therapeutic exercises, neuromuscular re-education/ balance exercises, joint mobilizations, modalities prn   to achieve established goals. Keiry may at times be seen by a PTA as part of the Rehab Team.     Cont PT for  8         weeks.   I certify the need for these services furnished under this plan of treatment and while under my care.______________________________ Physician/Referring Practitioner  Date of Signature

## 2018-02-27 ENCOUNTER — CLINICAL SUPPORT (OUTPATIENT)
Dept: REHABILITATION | Facility: HOSPITAL | Age: 73
End: 2018-02-27
Attending: INTERNAL MEDICINE
Payer: COMMERCIAL

## 2018-02-27 DIAGNOSIS — M76.61 ACHILLES TENDINITIS OF RIGHT LOWER EXTREMITY: ICD-10-CM

## 2018-02-27 PROCEDURE — 97014 ELECTRIC STIMULATION THERAPY: CPT | Mod: PO

## 2018-02-27 PROCEDURE — 97140 MANUAL THERAPY 1/> REGIONS: CPT | Mod: PO

## 2018-02-28 NOTE — PROGRESS NOTES
Physical Therapy Daily Note  Visit: 4    Name: Keiry Rudolph  Clinic Number: [id number]    Keiry is a 72 y.o. female evaluated on 02/27/2018.     Diagnosis:   Encounter Diagnosis   Name Primary?    Achilles tendinitis of right lower extremity        DOS: none    Physician: Donny Interiano MD  Treatment Orders: PT Eval and Treat    No past medical history on file.  No current outpatient prescriptions on file.     No current facility-administered medications for this visit.      Review of patient's allergies indicates:  Allergies not on file  Precautions: Standard  Occupation:       Subjective  Onset/JIMMY: sudden.  Pt woke with pain in her R heel when she got out of bed  Involved Area/Location: right  Current Symptoms: pain    Increases symptoms: walking  Decreases Symptoms: rest    Current Function: Pain in R heel with walking  Previous function: Walking pain free    Diagnostic Tests: NA    Pain Scale: Keiry rates R distal achilles pain to be 9  Patient Goals: decrease pain and ambulate without pain  Pt reported that her achilles has been very sore and wondered if with active PF exercise might be the cause.    Objective    Functional Limitations  Reports - G Codes    Category:  Mobility   Tool:  FOTO Outcomes Measurement System.   Score:  Patient scored out 10 of 100 on the FOTO Outcomes Measurement System.   Current:  G 8978 CM   Goal:  G 8979 CK           Observation: Pt with antalgic gait and mild swelling in R ankle    Posture: WNL      Passive Range of Motion: Ankle   Left Right   Dorsiflexion: NT 28   Plantar flexion NT 52            Lower Extremity Strength  Right LE  Left LE    Knee extension: NT Knee extension: NT   Knee flexion: NT Knee flexion: NT   Hip flexion: NT Hip flexion: NT   Hip extension:  NT Hip extension: NT   Hip abduction/Glut Medius: NT Hip abduction/Glut medius NT   Hip adduction: NT Hip  adduction: NT   Hip internal rotation: NT Hip internal rotation: NT   Hip external rotation: NT Hip external rotation: NT   Ankle dorsiflexion:   4/5 Ankle dorsiflexion:   4+/5   Ankle plantarflexion: NT Ankle plantarflexion: NT       Special Tests: Knee    Joint Mobility: hypomobile  Palpation:  Pt is TTP in the R achilles and toe flexor tendons as well as the general calf muscle on the R  Sensation: intact to light touch    Edema: min R ankle      Gait: Keiry ambulated 200 feet with no assistive device and decreased toe off on the R    Balance: NT    PT Evaluation Completed? Yes  Discussed Plan of Care with patient: Yes    Treatment:  Pt performed there ex's for R lower leg strengthening, ROM, flexibility and endurance including:  MH with pre-modulated ES to the R achilles tendon x 15 min    Manual Therapy x 30 min  Soft tissue mobilization L calf    Not performed today  Dry needling L  Sural  Tibial   Local achilles points    Exercises were reviewed and pt was able to demonstrate them prior to the end of the session.     Ed pt to use ice PRN 10- 20 min when pain or swelling occurs.     Keiry reyeso good understanding of the education provided. Patient demo good return demo of skill of exercises.      Assessment  This is a 72 y.o. female referred to outpatient physical therapy and presents with a medical diagnosis of   Encounter Diagnosis   Name Primary?    Achilles tendinitis of right lower extremity     and demonstrates limitations as described in the problem list. Pt will benefit from physcial therapy services in order to maximize pain free and/or functional use of right ankle. The following goals were discussed with the patient and patient is in agreement with them as to be addressed in the treatment plan.     Problem List: pain, decreased ROM, decreased flexibility, decreased strength, decreased motor control, antalgic gait and decreased ability to fully participate in recreational/sports related  activities.      GOALS: Short Term Goals:  4 weeks  1. Pt will demonstrate increased ankle dorsiflexion AROM to 20 degrees.  2. Pt will demonstrate increased ankle plantarflexion AROM to 56 degrees.  3. Independent with HEP to increase patient's tolerance for ambulating    Long Term Goals: 8 weeks  1.Report decreased R achilles and calf pain  <   / =  2  /10 with walkming to increase tolerance for ADL's  2.Increased MMT  for  R LE  to increase tolerance for ADL activities.  3.Increased arom  for  R foot and ankle to improve normal movement patterns during ADL's.  4. Pt will report improvement in overall functional abilities of LE, evidenced by improved score on FOTO survey    Plan  Pt will be treated by physical therapy 2-3 times a week for 8 weeks for Pt Education, HEP, therapeutic exercises, neuromuscular re-education/ balance exercises, joint mobilizations, modalities prn   to achieve established goals. Keiry may at times be seen by a PTA as part of the Rehab Team.     Cont PT for  8         weeks.   I certify the need for these services furnished under this plan of treatment and while under my care.______________________________ Physician/Referring Practitioner  Date of Signature

## 2018-03-01 ENCOUNTER — CLINICAL SUPPORT (OUTPATIENT)
Dept: REHABILITATION | Facility: HOSPITAL | Age: 73
End: 2018-03-01
Attending: INTERNAL MEDICINE
Payer: COMMERCIAL

## 2018-03-01 DIAGNOSIS — M76.61 ACHILLES TENDINITIS OF RIGHT LOWER EXTREMITY: ICD-10-CM

## 2018-03-01 PROCEDURE — 97035 APP MDLTY 1+ULTRASOUND EA 15: CPT | Mod: PO

## 2018-03-01 PROCEDURE — 97140 MANUAL THERAPY 1/> REGIONS: CPT | Mod: PO

## 2018-03-01 NOTE — PROGRESS NOTES
Physical Therapy Daily Note  Visit: 5    Name: Keiry Rudolph  Clinic Number: [id number]    Keiry is a 72 y.o. female evaluated on 03/01/2018.     Diagnosis:   Encounter Diagnosis   Name Primary?    Achilles tendinitis of right lower extremity        DOS: none    Physician: Donny Interiano MD  Treatment Orders: PT Eval and Treat    No past medical history on file.  No current outpatient prescriptions on file.     No current facility-administered medications for this visit.      Review of patient's allergies indicates:  Allergies not on file  Precautions: Standard  Occupation:       Subjective  Onset/JIMMY: sudden.  Pt woke with pain in her R heel when she got out of bed  Involved Area/Location: right  Current Symptoms: pain    Increases symptoms: walking  Decreases Symptoms: rest    Current Function: Pain in R heel with walking  Previous function: Walking pain free    Diagnostic Tests: NA    Pain Scale: Keiry rates R distal achilles pain to be 9  Patient Goals: decrease pain and ambulate without pain  Pt reported that her achilles continues to feel very sore.  She also reported that she will be out of town for the next few weeks, but plans to return for further Tx.    Objective    Functional Limitations  Reports - G Codes    Category:  Mobility   Tool:  FOTO Outcomes Measurement System.   Score:  Patient scored out 10 of 100 on the FOTO Outcomes Measurement System.   Current:  G 8978 CM   Goal:  G 8979 CK           Observation: Pt with antalgic gait and mild swelling in R ankle    Posture: WNL      Passive Range of Motion: Ankle   Left Right   Dorsiflexion: NT 5   Plantar flexion NT 54            Lower Extremity Strength  Right LE  Left LE    Knee extension: NT Knee extension: NT   Knee flexion: NT Knee flexion: NT   Hip flexion: NT Hip flexion: NT   Hip extension:  NT Hip extension: NT   Hip abduction/Glut Medius: NT Hip  abduction/Glut medius NT   Hip adduction: NT Hip adduction: NT   Hip internal rotation: NT Hip internal rotation: NT   Hip external rotation: NT Hip external rotation: NT   Ankle dorsiflexion:   4/5 Ankle dorsiflexion:   4+/5   Ankle plantarflexion: NT Ankle plantarflexion: NT       Special Tests: Knee    Joint Mobility: hypomobile  Palpation:  Pt is TTP in the R achilles and toe flexor tendons.  Less general calf muscle pain on the R  Sensation: intact to light touch    Edema: min R ankle      Gait: Keiry ambulated 200 feet with no assistive device and decreased toe off on the R    Balance: NT      Treatment:  Pt performed there ex's for R lower leg strengthening, ROM, flexibility and endurance including:  Ultrasound to R achilles tendon x 8 min plus 4 min set up and clean up time.    Manual Therapy x 30 min  Soft tissue mobilization L calf    Not performed today  Dry needling L  Sural  Tibial   Local achilles points  MH with pre-modulated ES to the R achilles tendon x 15 min  Exercises were reviewed and pt was able to demonstrate them prior to the end of the session.     Ed pt to use ice PRN 10- 20 min when pain or swelling occurs.     Keiry demo good understanding of the education provided. Patient demo good return demo of skill of exercises.      Assessment  This is a 72 y.o. female referred to outpatient physical therapy and presents with a medical diagnosis of   Encounter Diagnosis   Name Primary?    Achilles tendinitis of right lower extremity     and demonstrates limitations as described in the problem list. Pt will benefit from physcial therapy services in order to maximize pain free and/or functional use of right ankle. The following goals were discussed with the patient and patient is in agreement with them as to be addressed in the treatment plan. Pt with improved R ankle dorsiflexion ROM to -5 degrees for 22 degrees at initial evaluation.  Therefore, although her R calf flexibility has improved she  continue to c/o significant pain.    Problem List: pain, decreased ROM, decreased flexibility, decreased strength, decreased motor control, antalgic gait and decreased ability to fully participate in recreational/sports related activities.      GOALS: Short Term Goals:  4 weeks  1. Pt will demonstrate increased ankle dorsiflexion AROM to 20 degrees.  2. Pt will demonstrate increased ankle plantarflexion AROM to 56 degrees.  3. Independent with HEP to increase patient's tolerance for ambulating    Long Term Goals: 8 weeks  1.Report decreased R achilles and calf pain  <   / =  2  /10 with walkming to increase tolerance for ADL's  2.Increased MMT  for  R LE  to increase tolerance for ADL activities.  3.Increased arom  for  R foot and ankle to improve normal movement patterns during ADL's. - improving  4. Pt will report improvement in overall functional abilities of LE, evidenced by improved score on FOTO survey    Plan  Pt reported that she will be out of town for the next few weeks, but plans to return to continue her Tx's.

## 2018-03-02 ENCOUNTER — CLINICAL SUPPORT (OUTPATIENT)
Dept: REHABILITATION | Facility: HOSPITAL | Age: 73
End: 2018-03-02
Attending: INTERNAL MEDICINE
Payer: COMMERCIAL

## 2018-03-02 DIAGNOSIS — M76.61 ACHILLES TENDINITIS OF RIGHT LOWER EXTREMITY: ICD-10-CM

## 2018-03-02 PROCEDURE — 97140 MANUAL THERAPY 1/> REGIONS: CPT | Mod: PO

## 2018-03-02 PROCEDURE — 97110 THERAPEUTIC EXERCISES: CPT | Mod: PO

## 2018-03-13 NOTE — PROGRESS NOTES
Physical Therapy Daily Note  Visit: 5    Name: Keiry Rudolph  Clinic Number: [id number]    Keiry is a 72 y.o. female evaluated on 03/13/2018.     Diagnosis:   Encounter Diagnosis   Name Primary?    Achilles tendinitis of right lower extremity        DOS: none    Physician: Donny Interiano MD  Treatment Orders: PT Eval and Treat    No past medical history on file.  No current outpatient prescriptions on file.     No current facility-administered medications for this visit.      Review of patient's allergies indicates:  Allergies not on file  Precautions: Standard  Occupation:       Subjective  Onset/JIMMY: sudden.  Pt woke with pain in her R heel when she got out of bed  Involved Area/Location: right  Current Symptoms: pain    Increases symptoms: walking  Decreases Symptoms: rest    Current Function: Pain in R heel with walking  Previous function: Walking pain free    Diagnostic Tests: NA    Pain Scale: Keiry rates R distal achilles pain to be 9  Patient Goals: decrease pain and ambulate without pain  Pt reported that her achilles continues to feel very sore.  She stated that she was mistaken a nd that today will be her last Tx prior to leaving town for a few weeks.    Objective    Functional Limitations  Reports - G Codes    Category:  Mobility   Tool:  FOTO Outcomes Measurement System.   Score:  Patient scored out 10 of 100 on the FOTO Outcomes Measurement System.   Current:  G 8978 CM   Goal:  G 8979 CK           Observation: Pt with antalgic gait and mild swelling in R ankle    Posture: WNL      Passive Range of Motion: Ankle   Left Right   Dorsiflexion: NT 5   Plantar flexion NT 54            Lower Extremity Strength  Right LE  Left LE    Knee extension: NT Knee extension: NT   Knee flexion: NT Knee flexion: NT   Hip flexion: NT Hip flexion: NT   Hip extension:  NT Hip extension: NT   Hip abduction/Glut Medius: NT Hip  abduction/Glut medius NT   Hip adduction: NT Hip adduction: NT   Hip internal rotation: NT Hip internal rotation: NT   Hip external rotation: NT Hip external rotation: NT   Ankle dorsiflexion:   4/5 Ankle dorsiflexion:   4+/5   Ankle plantarflexion: NT Ankle plantarflexion: NT       Special Tests: Knee    Joint Mobility: hypomobile  Palpation:  Pt is TTP in the R achilles and toe flexor tendons.  Less general calf muscle pain on the R  Sensation: intact to light touch    Edema: min R ankle      Gait: Keiry ambulated 200 feet with no assistive device and decreased toe off on the R    Balance: NT      Treatment:  Pt performed there ex's for R lower leg strengthening, ROM, flexibility and endurance including:  Ultrasound to R achilles tendon x 8 min plus 4 min set up and clean up time.    Manual Therapy x 30 min  Soft tissue mobilization L calf    Not performed today  Dry needling L  Sural  Tibial   Local achilles points  MH with pre-modulated ES to the R achilles tendon x 15 min  Exercises were reviewed and pt was able to demonstrate them prior to the end of the session.     Ed pt to use ice PRN 10- 20 min when pain or swelling occurs.     Keiry demo good understanding of the education provided. Patient demo good return demo of skill of exercises.      Assessment  This is a 72 y.o. female referred to outpatient physical therapy and presents with a medical diagnosis of   Encounter Diagnosis   Name Primary?    Achilles tendinitis of right lower extremity     and demonstrates limitations as described in the problem list. Pt will benefit from physcial therapy services in order to maximize pain free and/or functional use of right ankle. The following goals were discussed with the patient and patient is in agreement with them as to be addressed in the treatment plan. Pt with improved R ankle dorsiflexion ROM to -5 degrees for 22 degrees at initial evaluation.  Therefore, although her R calf flexibility has improved she  continue to c/o significant pain.    Problem List: pain, decreased ROM, decreased flexibility, decreased strength, decreased motor control, antalgic gait and decreased ability to fully participate in recreational/sports related activities.      GOALS: Short Term Goals:  4 weeks  1. Pt will demonstrate increased ankle dorsiflexion AROM to 20 degrees.  2. Pt will demonstrate increased ankle plantarflexion AROM to 56 degrees.  3. Independent with HEP to increase patient's tolerance for ambulating    Long Term Goals: 8 weeks  1.Report decreased R achilles and calf pain  <   / =  2  /10 with walkming to increase tolerance for ADL's  2.Increased MMT  for  R LE  to increase tolerance for ADL activities.  3.Increased arom  for  R foot and ankle to improve normal movement patterns during ADL's. - improving  4. Pt will report improvement in overall functional abilities of LE, evidenced by improved score on FOTO survey    Plan  Pt reported that she will be out of town for the next few weeks, but plans to return to continue her Tx's.

## 2018-03-20 ENCOUNTER — CLINICAL SUPPORT (OUTPATIENT)
Dept: REHABILITATION | Facility: HOSPITAL | Age: 73
End: 2018-03-20
Attending: INTERNAL MEDICINE
Payer: COMMERCIAL

## 2018-03-20 DIAGNOSIS — M76.61 ACHILLES TENDINITIS OF RIGHT LOWER EXTREMITY: ICD-10-CM

## 2018-03-20 PROCEDURE — 97035 APP MDLTY 1+ULTRASOUND EA 15: CPT | Mod: PO

## 2018-03-20 PROCEDURE — 97140 MANUAL THERAPY 1/> REGIONS: CPT | Mod: PO

## 2018-03-20 PROCEDURE — 97530 THERAPEUTIC ACTIVITIES: CPT | Mod: PO

## 2018-03-20 NOTE — PROGRESS NOTES
Physical Therapy Daily Note  Visit: 7    Name: Keiyr Rudolph  Clinic Number: [id number]    Keiry is a 72 y.o. female evaluated on 03/20/2018.     Diagnosis:   Encounter Diagnosis   Name Primary?    Achilles tendinitis of right lower extremity        DOS: none    Physician: Donny Interiano MD  Treatment Orders: PT Eval and Treat    No past medical history on file.  No current outpatient prescriptions on file.     No current facility-administered medications for this visit.      Review of patient's allergies indicates:  Allergies not on file  Precautions: Standard  Occupation:       Subjective  Onset/JIMMY: sudden.  Pt woke with pain in her R heel when she got out of bed  Involved Area/Location: right  Current Symptoms: pain    Increases symptoms: walking  Decreases Symptoms: rest    Current Function: Pain in R heel with walking  Previous function: Walking pain free    Diagnostic Tests: NA    Pain Scale: Keiry rates R distal achilles pain to be 9  Patient Goals: decrease pain and ambulate without pain  Pt reported that she feels as if the Tx we were using prior to her leaving town was helping.  She continues to c/o pain with toe off on the R    Objective    Functional Limitations  Reports - G Codes    Category:  Mobility   Tool:  FOTO Outcomes Measurement System.   Score:  Patient scored out 10 of 100 on the FOTO Outcomes Measurement System.   Current:  G 8978 CM   Goal:  G 8979 CK           Observation: Pt with antalgic gait and mild swelling in R ankle    Posture: WNL      Passive Range of Motion: Ankle   Left Right   Dorsiflexion: NT 5   Plantar flexion NT 54            Lower Extremity Strength - pt requested that we not muscle test here legs secondary to L hip pain  Right LE  Left LE    Knee extension: NT Knee extension: NT   Knee flexion: NT Knee flexion: NT   Hip flexion: NT Hip flexion: NT   Hip extension:  NT Hip  extension: NT   Hip abduction/Glut Medius: NT Hip abduction/Glut medius NT   Hip adduction: NT Hip adduction: NT   Hip internal rotation: NT Hip internal rotation: NT   Hip external rotation: NT Hip external rotation: NT   Ankle dorsiflexion:   4/5 Ankle dorsiflexion:   4+/5   Ankle plantarflexion: NT Ankle plantarflexion: NT       Special Tests: Knee    Joint Mobility: hypomobile  Palpation:  Pt is TTP in the R achilles at it's insertion to the calcaneous. She is also TTP in the R calf, more so medially and at with most proximal attachment.  Sensation: intact to light touch    Edema: min R ankle      Gait: Keiry ambulated 200 feet with no assistive device and decreased toe off on the R    Balance: NT      Treatment:  Pt performed there ex's for R lower leg strengthening, ROM, flexibility and endurance including:  Ultrasound to R achilles tendon x 8 min plus 4 min set up and clean up time.    Manual Therapy x 30 min  Soft tissue mobilization L calf  Dry needling L  Sural  Tibial   Local calf points including Travell tender points.    Therapeutic exercise x 20 min  R ankle orange TB inversion, eversion and dorsiflexion 20 x 3 - band held by Therapist  R ankle plantar flexion 20 x 3 with blue TB    Not performed.  MH with pre-modulated ES to the R achilles tendon x 15 min  Exercises were reviewed and pt was able to demonstrate them prior to the end of the session.     Ed pt to use ice PRN 10- 20 min when pain or swelling occurs.     Keiry demo good understanding of the education provided. Patient demo good return demo of skill of exercises.      Assessment  This is a 72 y.o. female referred to outpatient physical therapy and presents with a medical diagnosis of   Encounter Diagnosis   Name Primary?    Achilles tendinitis of right lower extremity     and demonstrates limitations as described in the problem list. Pt will benefit from physcial therapy services in order to maximize pain free and/or functional use of  right ankle. The following goals were discussed with the patient and she is in agreement.    Pt reports little change in her Sx with continued pain with toe off on the R while ambulating.  She tolerated Tx well with no c/o increased pain with TB ankle exercises.    Problem List: pain, decreased ROM, decreased flexibility, decreased strength, decreased motor control, antalgic gait and decreased ability to fully participate in recreational/sports related activities.      GOALS: Short Term Goals:  4 weeks  1. Pt will demonstrate increased ankle dorsiflexion AROM to 2 degrees.  2. Pt will demonstrate increased ankle plantarflexion AROM to 56 degrees.  3. Independent with HEP to increase patient's tolerance for ambulating    Long Term Goals: 8 weeks  1.Report decreased R achilles and calf pain  <   / =  2  /10 with walkming to increase tolerance for ADL's  2.Increased MMT  for  R LE  to increase tolerance for ADL activities.  3.Increased arom  for  R foot and ankle to improve normal movement patterns during ADL's. - improving  4. Pt will report improvement in overall functional abilities of LE, evidenced by improved score on FOTO survey    Plan  Pt reported that she will be out of town for the next few weeks, but plans to return to continue her Tx's.

## 2018-03-22 ENCOUNTER — CLINICAL SUPPORT (OUTPATIENT)
Dept: REHABILITATION | Facility: HOSPITAL | Age: 73
End: 2018-03-22
Attending: INTERNAL MEDICINE
Payer: COMMERCIAL

## 2018-03-22 DIAGNOSIS — M76.61 ACHILLES TENDINITIS OF RIGHT LOWER EXTREMITY: ICD-10-CM

## 2018-03-22 PROCEDURE — G8979 MOBILITY GOAL STATUS: HCPCS | Mod: CK,PO

## 2018-03-22 PROCEDURE — 97033 APP MDLTY 1+IONTPHRSIS EA 15: CPT | Mod: PO

## 2018-03-22 PROCEDURE — 97530 THERAPEUTIC ACTIVITIES: CPT | Mod: PO

## 2018-03-22 PROCEDURE — G8978 MOBILITY CURRENT STATUS: HCPCS | Mod: CM,PO

## 2018-03-22 PROCEDURE — 97140 MANUAL THERAPY 1/> REGIONS: CPT | Mod: PO

## 2018-03-22 NOTE — PROGRESS NOTES
Physical Therapy Daily Note  Visit: 7    Name: Keiry Rudolph  Clinic Number: [id number]    Keiry is a 72 y.o. female treated on 03/22/2018.     Diagnosis:   Encounter Diagnosis   Name Primary?    Achilles tendinitis of right lower extremity        DOS: none    Physician: Donny Interiano MD  Treatment Orders: PT Eval and Treat    No past medical history on file.  No current outpatient prescriptions on file.     No current facility-administered medications for this visit.      Review of patient's allergies indicates:  Allergies not on file  Precautions: Standard  Occupation:       Subjective  Onset/JIMMY: sudden.  Pt woke with pain in her R heel when she got out of bed  Involved Area/Location: right  Current Symptoms: pain    Increases symptoms: walking  Decreases Symptoms: rest    Current Function: Pain in R heel with walking  Previous function: Walking pain free    Diagnostic Tests: NA    Pain Scale: Keiry rates R distal achilles pain to be 9  Patient Goals: decrease pain and ambulate without pain  Pt reported that she continues to have pain in her R heel at the attachment of the achilles tendon with toe off while walking and with pressing down on the excellorator.     Objective    Functional Limitations  Reports - G Codes    Category:  Mobility   Tool:  FOTO Outcomes Measurement System.   Score:  Patient scored out 16 of 100 on the FOTO Outcomes Measurement System.   Current:  G 8978 CM   Goal:  G 8979 CK           Observation: Pt with antalgic gait and slight swelling in R ankle    Posture: WNL      Passive Range of Motion: Ankle   Left Right   Dorsiflexion: NT 5   Plantar flexion NT 54            Lower Extremity Strength - pt requested that we not muscle test here legs secondary to L hip pain  Right LE  Left LE    Knee extension: NT Knee extension: NT   Knee flexion: NT Knee flexion: NT   Hip flexion: NT Hip flexion: NT    Hip extension:  NT Hip extension: NT   Hip abduction/Glut Medius: NT Hip abduction/Glut medius NT   Hip adduction: NT Hip adduction: NT   Hip internal rotation: NT Hip internal rotation: NT   Hip external rotation: NT Hip external rotation: NT   Ankle dorsiflexion:   4/5 Ankle dorsiflexion:   4+/5   Ankle plantarflexion: 2/5 Ankle plantarflexion: 3/5   Inversion  2/5      3+/5  E#veraion  2/5      3+/5    Special Tests: Knee    Joint Mobility: hypomobile  Palpation:  Pt is TTP in the R achilles at it's insertion to the calcaneous. She is also TTP in the R calf, more so medially and at with most proximal attachment.  Sensation: intact to light touch    Edema: min R ankle      Gait: Keiry ambulated 200 feet with no assistive device and decreased toe off on the R    Balance: NT      Treatment:  Pt performed there ex's for R lower leg strengthening, ROM, flexibility and endurance including:  Phonophoresis tot the R distal achilles tendon x 20 min    Manual Therapy x 20 min  Kinesiotape R achilles tendon  Soft tissue mobilization L calf    Therapeutic exercise x 20 min  Not performed.  MH with pre-modulated ES to the R achilles tendon x 15 min  Exercises were reviewed and pt was able to demonstrate them prior to the end of the session.   Dry needling L  Sural  Tibial   Local calf points including Travell tender points.  R ankle orange TB inversion, eversion and dorsiflexion 20 x 3 - band held by Therapist  R ankle plantar flexion 20 x 3 with blue TB      Ed pt to use ice PRN 10- 20 min when pain or swelling occurs.     Keiry demo good understanding of the education provided. Patient demo good return demo of skill of exercises.      Assessment  This is a 72 y.o. female referred to outpatient physical therapy and presents with a medical diagnosis of   Encounter Diagnosis   Name Primary?    Achilles tendinitis of right lower extremity     and demonstrates limitations as described in the problem list. Pt will benefit  from physcial therapy services in order to maximize pain free and/or functional use of right ankle. The following goals were discussed with the patient and she is in agreement.    Pt reports little change in her Sx.  She continues to have pain with toe off and with pressing down on the excellorator while driving.  She remains TTP at the distal achilles.  She reports no pain with manually resisted R ankle inversion, eversion, PH or DF  She reported some reproduction of her discomfort with forced DF.  Problem List: pain, decreased ROM, decreased flexibility, decreased strength, decreased motor control, antalgic gait and decreased ability to fully participate in recreational/sports related activities.      GOALS: Short Term Goals:  4 weeks  1. Pt will demonstrate increased ankle dorsiflexion AROM to 2 degrees.  2. Pt will demonstrate increased ankle plantarflexion AROM to 56 degrees.  3. Independent with HEP to increase patient's tolerance for ambulating    Long Term Goals: 8 weeks  1.Report decreased R achilles and calf pain  <   / =  2  /10 with walkming to increase tolerance for ADL's  2.Increased MMT  for  R LE  to increase tolerance for ADL activities.  3.Increased arom  for  R foot and ankle to improve normal movement patterns during ADL's. - improving  4. Pt will report improvement in overall functional abilities of LE, evidenced by improved score on FOTO survey    Plan  Pt reported that she will be out of town for the next few weeks, but plans to return to continue her Tx's.    g

## 2018-03-23 ENCOUNTER — CLINICAL SUPPORT (OUTPATIENT)
Dept: REHABILITATION | Facility: HOSPITAL | Age: 73
End: 2018-03-23
Attending: INTERNAL MEDICINE
Payer: COMMERCIAL

## 2018-03-23 DIAGNOSIS — M76.61 ACHILLES TENDINITIS OF RIGHT LOWER EXTREMITY: ICD-10-CM

## 2018-03-23 PROCEDURE — 97140 MANUAL THERAPY 1/> REGIONS: CPT | Mod: PO

## 2018-03-23 PROCEDURE — 97033 APP MDLTY 1+IONTPHRSIS EA 15: CPT | Mod: PO

## 2018-03-29 NOTE — PROGRESS NOTES
Physical Therapy Daily Note  Visit: 7    Name: Keiry Rudolph  Clinic Number: [id number]    Keiry is a 72 y.o. female treated on 03/29/2018.     Diagnosis:   Encounter Diagnosis   Name Primary?    Achilles tendinitis of right lower extremity        DOS: none    Physician: Donny Interiano MD  Treatment Orders: PT Eval and Treat    No past medical history on file.  No current outpatient prescriptions on file.     No current facility-administered medications for this visit.      Review of patient's allergies indicates:  Allergies not on file  Precautions: Standard  Occupation:       Subjective  Onset/JIMMY: sudden.  Pt woke with pain in her R heel when she got out of bed  Involved Area/Location: right  Current Symptoms: pain    Increases symptoms: walking  Decreases Symptoms: rest    Current Function: Pain in R heel with walking  Previous function: Walking pain free    Diagnostic Tests: NA    Pain Scale: Keiry rates R distal achilles pain to be 9  Patient Goals: decrease pain and ambulate without pain  Pt reported that her R Heel feels a little better today     Objective    Functional Limitations  Reports - G Codes    Category:  Mobility   Tool:  FOTO Outcomes Measurement System.   Score:  Patient scored out 16 of 100 on the FOTO Outcomes Measurement System.   Current:  G 8978 CM   Goal:  G 8979 CK           Observation: Pt with antalgic gait and slight swelling in R ankle    Posture: WNL      Passive Range of Motion: Ankle   Left Right   Dorsiflexion: NT 5   Plantar flexion NT 54            Lower Extremity Strength - pt requested that we not muscle test here legs secondary to L hip pain  Right LE  Left LE    Knee extension: NT Knee extension: NT   Knee flexion: NT Knee flexion: NT   Hip flexion: NT Hip flexion: NT   Hip extension:  NT Hip extension: NT   Hip abduction/Glut Medius: NT Hip abduction/Glut medius NT   Hip  adduction: NT Hip adduction: NT   Hip internal rotation: NT Hip internal rotation: NT   Hip external rotation: NT Hip external rotation: NT   Ankle dorsiflexion:   4/5 Ankle dorsiflexion:   4+/5   Ankle plantarflexion: 2/5 Ankle plantarflexion: 3/5   Inversion  2/5      3+/5  E#veraion  2/5      3+/5    Special Tests: Knee    Joint Mobility: hypomobile  Palpation:  Pt is TTP in the R achilles at it's insertion to the calcaneous. She is also TTP in the R calf, more so medially and at with most proximal attachment.  Sensation: intact to light touch    Edema: min R ankle      Gait: Keiry ambulated 200 feet with no assistive device and decreased toe off on the R    Balance: NT      Treatment:  Pt performed there ex's for R lower leg strengthening, ROM, flexibility and endurance including:  Ionophoresis tot the R distal achilles tendon x 20 min with Deximethazone     Manual Therapy x 20 min  Kinesiotape R achilles tendon  Soft tissue mobilization L calf    Therapeutic exercise x 20 min  Not performed.   with pre-modulated ES to the R achilles tendon x 15 min  Exercises were reviewed and pt was able to demonstrate them prior to the end of the session.   Dry needling L  Sural  Tibial   Local calf points including Travell tender points.  R ankle orange TB inversion, eversion and dorsiflexion 20 x 3 - band held by Therapist  R ankle plantar flexion 20 x 3 with blue TB      Ed pt to use ice PRN 10- 20 min when pain or swelling occurs.     Keiry demo good understanding of the education provided. Patient demo good return demo of skill of exercises.      Assessment  This is a 72 y.o. female referred to outpatient physical therapy and presents with a medical diagnosis of   Encounter Diagnosis   Name Primary?    Achilles tendinitis of right lower extremity     and demonstrates limitations as described in the problem list. Pt will benefit from physcial therapy services in order to maximize pain free and/or functional use of  right ankle. The following goals were discussed with the patient and she is in agreement.    Pt reports that her heel felt a little better following Iontophoresis.  Problem List: pain, decreased ROM, decreased flexibility, decreased strength, decreased motor control, antalgic gait and decreased ability to fully participate in recreational/sports related activities.      GOALS: Short Term Goals:  4 weeks  1. Pt will demonstrate increased ankle dorsiflexion AROM to 2 degrees.  2. Pt will demonstrate increased ankle plantarflexion AROM to 56 degrees.  3. Independent with HEP to increase patient's tolerance for ambulating    Long Term Goals: 8 weeks  1.Report decreased R achilles and calf pain  <   / =  2  /10 with walkming to increase tolerance for ADL's  2.Increased MMT  for  R LE  to increase tolerance for ADL activities.  3.Increased arom  for  R foot and ankle to improve normal movement patterns during ADL's. - improving  4. Pt will report improvement in overall functional abilities of LE, evidenced by improved score on FOTO survey    Plan  Pt reported that she will be out of town for the next few weeks, but plans to return to continue her Tx's.    g

## 2018-04-19 ENCOUNTER — CLINICAL SUPPORT (OUTPATIENT)
Dept: REHABILITATION | Facility: HOSPITAL | Age: 73
End: 2018-04-19
Attending: INTERNAL MEDICINE
Payer: COMMERCIAL

## 2018-04-19 DIAGNOSIS — M76.61 ACHILLES TENDINITIS OF RIGHT LOWER EXTREMITY: ICD-10-CM

## 2018-04-19 NOTE — PROGRESS NOTES
Physical Therapy Daily Note  Visit: 8    Name: Keiry Rudolph  Clinic Number: [id number]    Keiry is a 72 y.o. female treated on 04/19/2018.     Diagnosis:   No diagnosis found.    DOS: none    Physician: Donny Interiano MD  Treatment Orders: PT Eval and Treat    No past medical history on file.  No current outpatient prescriptions on file.     No current facility-administered medications for this visit.      Review of patient's allergies indicates:  Allergies not on file  Precautions: Standard  Occupation:       Subjective  Onset/JIMMY: sudden.  Pt woke with pain in her R heel when she got out of bed  Involved Area/Location: right  Current Symptoms: pain    Increases symptoms: walking  Decreases Symptoms: rest    Current Function: Pain in R heel with walking  Previous function: Walking pain free    Diagnostic Tests: NA    Pain Scale: Keiry rates R distal achilles pain to be 9  Patient Goals: decrease pain and ambulate without pain  Pt reported that she had her annual medical evaluation a [facility] and it was suggested that we co     Objective    Functional Limitations  Reports - G Codes    Category:  Mobility   Tool:  FOTO Outcomes Measurement System.   Score:  Patient scored out 16 of 100 on the FOTO Outcomes Measurement System.   Current:  G 8978 CM   Goal:  G 8979 CK           Observation: Pt with antalgic gait and slight swelling in R ankle    Posture: WNL      Passive Range of Motion: Ankle   Left Right   Dorsiflexion: NT 5   Plantar flexion NT 54            Lower Extremity Strength - pt requested that we not muscle test here legs secondary to L hip pain  Right LE  Left LE    Knee extension: NT Knee extension: NT   Knee flexion: NT Knee flexion: NT   Hip flexion: NT Hip flexion: NT   Hip extension:  NT Hip extension: NT   Hip abduction/Glut Medius: NT Hip abduction/Glut medius NT   Hip adduction: NT Hip adduction: NT    Hip internal rotation: NT Hip internal rotation: NT   Hip external rotation: NT Hip external rotation: NT   Ankle dorsiflexion:   4/5 Ankle dorsiflexion:   4+/5   Ankle plantarflexion: 2/5 Ankle plantarflexion: 3/5   Inversion  2/5      3+/5  E#veraion  2/5      3+/5    Special Tests: Knee    Joint Mobility: hypomobile  Palpation:  Pt is TTP in the R achilles at it's insertion to the calcaneous. She is also TTP in the R calf, more so medially and at with most proximal attachment.  Sensation: intact to light touch    Edema: min R ankle      Gait: Keiry ambulated 200 feet with no assistive device and decreased toe off on the R    Balance: NT      Treatment:  Pt performed there ex's for R lower leg strengthening, ROM, flexibility and endurance including:  Ionophoresis tot the R distal achilles tendon x 20 min with Deximethazone     Manual Therapy x 20 min  Kinesiotape R achilles tendon  Soft tissue mobilization L calf    Therapeutic exercise x 20 min  Not performed.   with pre-modulated ES to the R achilles tendon x 15 min  Exercises were reviewed and pt was able to demonstrate them prior to the end of the session.   Dry needling L  Sural  Tibial   Local calf points including Travell tender points.  R ankle orange TB inversion, eversion and dorsiflexion 20 x 3 - band held by Therapist  R ankle plantar flexion 20 x 3 with blue TB      Ed pt to use ice PRN 10- 20 min when pain or swelling occurs.     Keiry demo good understanding of the education provided. Patient demo good return demo of skill of exercises.      Assessment  This is a 72 y.o. female referred to outpatient physical therapy and presents with a medical diagnosis of   No diagnosis found. and demonstrates limitations as described in the problem list. Pt will benefit from physcial therapy services in order to maximize pain free and/or functional use of right ankle. The following goals were discussed with the patient and she is in agreement.    Pt reports  that her heel felt a little better following Iontophoresis.  Problem List: pain, decreased ROM, decreased flexibility, decreased strength, decreased motor control, antalgic gait and decreased ability to fully participate in recreational/sports related activities.      GOALS: Short Term Goals:  4 weeks  1. Pt will demonstrate increased ankle dorsiflexion AROM to 2 degrees.  2. Pt will demonstrate increased ankle plantarflexion AROM to 56 degrees.  3. Independent with HEP to increase patient's tolerance for ambulating    Long Term Goals: 8 weeks  1.Report decreased R achilles and calf pain  <   / =  2  /10 with walkming to increase tolerance for ADL's  2.Increased MMT  for  R LE  to increase tolerance for ADL activities.  3.Increased arom  for  R foot and ankle to improve normal movement patterns during ADL's. - improving  4. Pt will report improvement in overall functional abilities of LE, evidenced by improved score on FOTO survey    Plan  Pt reported that she will be out of town for the next few weeks, but plans to return to continue her Tx's.    g

## 2018-04-25 ENCOUNTER — CLINICAL SUPPORT (OUTPATIENT)
Dept: REHABILITATION | Facility: HOSPITAL | Age: 73
End: 2018-04-25
Attending: INTERNAL MEDICINE
Payer: COMMERCIAL

## 2018-04-25 DIAGNOSIS — M76.61 ACHILLES TENDINITIS OF RIGHT LOWER EXTREMITY: ICD-10-CM

## 2018-04-25 NOTE — PROGRESS NOTES
Physical Therapy Daily Note  Visit: 8    Name: Keiry Rudolph  Clinic Number: [id number]    Keiry is a 72 y.o. female treated on 04/25/2018.     Diagnosis:   Encounter Diagnosis   Name Primary?    Achilles tendinitis of right lower extremity        DOS: none    Physician: Donny Interiano MD  Treatment Orders: PT Eval and Treat    No past medical history on file.  No current outpatient prescriptions on file.     No current facility-administered medications for this visit.      Review of patient's allergies indicates:  Allergies not on file  Precautions: Standard  Occupation:       Subjective  Onset/JIMMY: sudden.  Pt woke with pain in her R heel when she got out of bed  Involved Area/Location: right  Current Symptoms: pain    Increases symptoms: walking  Decreases Symptoms: rest    Current Function: Pain in R heel with walking  Previous function: Walking pain free    Diagnostic Tests: NA    Pain Scale: Keiry rates R distal achilles pain to be 9  Patient Goals: decrease pain and ambulate without pain  Pt reported that she had her annual medical evaluation a [facility] and it was suggested that we co     Objective    Functional Limitations  Reports - G Codes    Category:  Mobility   Tool:  FOTO Outcomes Measurement System.   Score:  Patient scored out 16 of 100 on the FOTO Outcomes Measurement System.   Current:  G 8978 CM   Goal:  G 8979 CK           Observation: Pt with antalgic gait and slight swelling in R ankle    Posture: WNL      Passive Range of Motion: Ankle   Left Right   Dorsiflexion: NT 5   Plantar flexion NT 54            Lower Extremity Strength - pt requested that we not muscle test here legs secondary to L hip pain  Right LE  Left LE    Knee extension: NT Knee extension: NT   Knee flexion: NT Knee flexion: NT   Hip flexion: NT Hip flexion: NT   Hip extension:  NT Hip extension: NT   Hip abduction/Glut Medius:  NT Hip abduction/Glut medius NT   Hip adduction: NT Hip adduction: NT   Hip internal rotation: NT Hip internal rotation: NT   Hip external rotation: NT Hip external rotation: NT   Ankle dorsiflexion:   4/5 Ankle dorsiflexion:   4+/5   Ankle plantarflexion: 2/5 Ankle plantarflexion: 3/5   Inversion  2/5      3+/5  E#veraion  2/5      3+/5    Special Tests: Knee    Joint Mobility: hypomobile  Palpation:  Pt is TTP in the R achilles at it's insertion to the calcaneous. She is also TTP in the R calf, more so medially and at with most proximal attachment.  Sensation: intact to light touch    Edema: min R ankle      Gait: Keiry ambulated 200 feet with no assistive device and decreased toe off on the R    Balance: NT      Treatment:  Pt performed there ex's for R lower leg strengthening, ROM, flexibility and endurance including:  IFC R achilles      Manual Therapy x 20 min    Soft tissue mobilization L calf    Therapeutic exercise x 20 min  Not performed.   with pre-modulated ES to the R achilles tendon x 15 min  Exercises were reviewed and pt was able to demonstrate them prior to the end of the session.   Dry needling L  Sural  Tibial   Local calf points including Travell tender points.  R ankle orange TB inversion, eversion and dorsiflexion 20 x 3 - band held by Therapist  R ankle plantar flexion 20 x 3 with blue TB  Ionophoresis tot the R distal achilles tendon x 20 min with Deximethazone   Kinesiotape R achilles tendon  Ed pt to use ice PRN 10- 20 min when pain or swelling occurs.     Keiry demo good understanding of the education provided. Patient demo good return demo of skill of exercises.      Assessment  This is a 72 y.o. female referred to outpatient physical therapy and presents with a medical diagnosis of   Encounter Diagnosis   Name Primary?    Achilles tendinitis of right lower extremity     and demonstrates limitations as described in the problem list. Pt will benefit from physcial therapy services in  order to maximize pain free and/or functional use of right ankle. The following goals were discussed with the patient and she is in agreement.    Pt reports that her heel felt a little better following Iontophoresis.  Problem List: pain, decreased ROM, decreased flexibility, decreased strength, decreased motor control, antalgic gait and decreased ability to fully participate in recreational/sports related activities.      GOALS: Short Term Goals:  4 weeks  1. Pt will demonstrate increased ankle dorsiflexion AROM to 2 degrees.  2. Pt will demonstrate increased ankle plantarflexion AROM to 56 degrees.  3. Independent with HEP to increase patient's tolerance for ambulating    Long Term Goals: 8 weeks  1.Report decreased R achilles and calf pain  <   / =  2  /10 with walkming to increase tolerance for ADL's  2.Increased MMT  for  R LE  to increase tolerance for ADL activities.  3.Increased arom  for  R foot and ankle to improve normal movement patterns during ADL's. - improving  4. Pt will report improvement in overall functional abilities of LE, evidenced by improved score on FOTO survey    Plan  Pt reported that she will be out of town for the next few weeks, but plans to return to continue her Tx's.

## 2018-04-27 ENCOUNTER — CLINICAL SUPPORT (OUTPATIENT)
Dept: REHABILITATION | Facility: HOSPITAL | Age: 73
End: 2018-04-27
Attending: INTERNAL MEDICINE
Payer: COMMERCIAL

## 2018-04-27 DIAGNOSIS — M76.61 ACHILLES TENDINITIS OF RIGHT LOWER EXTREMITY: ICD-10-CM

## 2018-04-27 PROCEDURE — 97014 ELECTRIC STIMULATION THERAPY: CPT | Mod: PO

## 2018-04-27 PROCEDURE — 97140 MANUAL THERAPY 1/> REGIONS: CPT | Mod: PO

## 2018-04-27 NOTE — PROGRESS NOTES
Physical Therapy Daily Note  Visit: 8    Name: Keiry Rudolph  Clinic Number: [id number]    Keiry is a 72 y.o. female treated on 04/27/2018.     Diagnosis:   Encounter Diagnosis   Name Primary?    Achilles tendinitis of right lower extremity        DOS: none    Physician: Donny Interiano MD  Treatment Orders: PT Eval and Treat    No past medical history on file.  No current outpatient prescriptions on file.     No current facility-administered medications for this visit.      Review of patient's allergies indicates:  Allergies not on file  Precautions: Standard  Occupation:       Subjective  Onset/JIMMY: sudden.  Pt woke with pain in her R heel when she got out of bed  Involved Area/Location: right  Current Symptoms: pain    Increases symptoms: walking  Decreases Symptoms: rest    Current Function: Pain in R heel with walking  Previous function: Walking pain free    Diagnostic Tests: NA    Pain Scale: Keiry rates R distal achilles pain to be 9  Patient Goals: decrease pain and ambulate without pain  Pt reported that she had a few hours of relief following her last Tx.    Objective    Functional Limitations  Reports - G Codes    Category:  Mobility   Tool:  FOTO Outcomes Measurement System.   Score:  Patient scored out 16 of 100 on the FOTO Outcomes Measurement System.   Current:  G 8978 CM   Goal:  G 8979 CK           Observation: Pt with antalgic gait and slight swelling in R ankle    Posture: WNL      Passive Range of Motion: Ankle   Left Right   Dorsiflexion: NT 5   Plantar flexion NT 54            Lower Extremity Strength - pt requested that we not muscle test here legs secondary to L hip pain  Right LE  Left LE    Knee extension: NT Knee extension: NT   Knee flexion: NT Knee flexion: NT   Hip flexion: NT Hip flexion: NT   Hip extension:  NT Hip extension: NT   Hip abduction/Glut Medius: NT Hip abduction/Glut medius NT    Hip adduction: NT Hip adduction: NT   Hip internal rotation: NT Hip internal rotation: NT   Hip external rotation: NT Hip external rotation: NT   Ankle dorsiflexion:   4/5 Ankle dorsiflexion:   4+/5   Ankle plantarflexion: 2/5 Ankle plantarflexion: 3/5   Inversion  2/5      3+/5  E#veraion  2/5      3+/5    Special Tests: Knee    Joint Mobility: hypomobile  Palpation:  Pt is TTP in the R achilles at it's insertion to the calcaneous. She is also TTP in the R calf, more so medially and at with most proximal attachment.  Sensation: intact to light touch    Edema: min R ankle      Gait: Keiry ambulated 200 feet with no assistive device and decreased toe off on the R    Balance: NT      Treatment:  Pt performed there ex's for R lower leg strengthening, ROM, flexibility and endurance including:  IFC R achilles      Manual Therapy x 20 min    Soft tissue mobilization L calf    Therapeutic exercise x 20 min  Not performed.  MH with pre-modulated ES to the R achilles tendon x 15 min  Exercises were reviewed and pt was able to demonstrate them prior to the end of the session.   Dry needling L  Sural  Tibial   Local calf points including Travell tender points.  R ankle orange TB inversion, eversion and dorsiflexion 20 x 3 - band held by Therapist  R ankle plantar flexion 20 x 3 with blue TB  Ionophoresis tot the R distal achilles tendon x 20 min with Deximethazone   Kinesiotape R achilles tendon  Ed pt to use ice PRN 10- 20 min when pain or swelling occurs.     Keiry demo good understanding of the education provided. Patient demo good return demo of skill of exercises.      Assessment  This is a 72 y.o. female referred to outpatient physical therapy and presents with a medical diagnosis of   Encounter Diagnosis   Name Primary?    Achilles tendinitis of right lower extremity     and demonstrates limitations as described in the problem list. Pt will benefit from physcial therapy services in order to maximize pain free  and/or functional use of right ankle. The following goals were discussed with the patient and she is in agreement.    Pt reports that her heel felt better for a few hours following her last Tx.  Problem List: pain, decreased ROM, decreased flexibility, decreased strength, decreased motor control, antalgic gait and decreased ability to fully participate in recreational/sports related activities.      GOALS: Short Term Goals:  4 weeks  1. Pt will demonstrate increased ankle dorsiflexion AROM to 2 degrees.  2. Pt will demonstrate increased ankle plantarflexion AROM to 56 degrees.  3. Independent with HEP to increase patient's tolerance for ambulating    Long Term Goals: 8 weeks  1.Report decreased R achilles and calf pain  <   / =  2  /10 with walkming to increase tolerance for ADL's  2.Increased MMT  for  R LE  to increase tolerance for ADL activities.  3.Increased arom  for  R foot and ankle to improve normal movement patterns during ADL's. - improving  4. Pt will report improvement in overall functional abilities of LE, evidenced by improved score on FOTO survey    Plan  Pt reported that she will be out of town for the next few weeks, but plans to return to continue her Tx's.    g

## 2018-05-01 ENCOUNTER — CLINICAL SUPPORT (OUTPATIENT)
Dept: REHABILITATION | Facility: HOSPITAL | Age: 73
End: 2018-05-01
Attending: INTERNAL MEDICINE
Payer: COMMERCIAL

## 2018-05-01 DIAGNOSIS — M76.61 ACHILLES TENDINITIS OF RIGHT LOWER EXTREMITY: ICD-10-CM

## 2018-05-01 PROCEDURE — 97530 THERAPEUTIC ACTIVITIES: CPT | Mod: PO

## 2018-05-01 PROCEDURE — 97014 ELECTRIC STIMULATION THERAPY: CPT | Mod: PO

## 2018-05-01 NOTE — PROGRESS NOTES
Physical Therapy Daily Note  Visit: 9    Name: Keiry Rudolph  Clinic Number: [id number]    Keiry is a 72 y.o. female treated on 05/01/2018.     Diagnosis:   Encounter Diagnosis   Name Primary?    Achilles tendinitis of right lower extremity        DOS: none    Physician: Donny Interiano MD  Treatment Orders: PT Eval and Treat    No past medical history on file.  No current outpatient prescriptions on file.     No current facility-administered medications for this visit.      Review of patient's allergies indicates:  Allergies not on file  Precautions: Standard  Occupation:       Subjective  Onset/JIMMY: sudden.  Pt woke with pain in her R heel when she got out of bed  Involved Area/Location: right  Current Symptoms: pain    Increases symptoms: walking  Decreases Symptoms: rest    Current Function: Pain in R heel with walking  Previous function: Walking pain free    Diagnostic Tests: NA    Pain Scale: Keiry rates R distal achilles pain to be 9  Patient Goals: decrease pain and ambulate without pain  Pt reported that she was sore following her last Tx.  She wonders if the massage caused her increased pain.  Pt stated that she would is getting relief from the interferential and would like to continue the Tx    Objective    Functional Limitations  Reports - G Codes    Category:  Mobility   Tool:  FOTO Outcomes Measurement System.   Score:  Patient scored out 16 of 100 on the FOTO Outcomes Measurement System.   Current:  G 8978 CM   Goal:  G 8979 CK           Observation: Pt with antalgic gait and slight swelling in R ankle    Posture: WNL      Passive Range of Motion: Ankle   Left Right   Dorsiflexion: NT 5   Plantar flexion NT 54            Lower Extremity Strength - pt requested that we not muscle test here legs secondary to L hip pain  Right LE  Left LE    Knee extension: NT Knee extension: NT   Knee flexion: NT Knee  flexion: NT   Hip flexion: NT Hip flexion: NT   Hip extension:  NT Hip extension: NT   Hip abduction/Glut Medius: NT Hip abduction/Glut medius NT   Hip adduction: NT Hip adduction: NT   Hip internal rotation: NT Hip internal rotation: NT   Hip external rotation: NT Hip external rotation: NT   Ankle dorsiflexion:   4/5 Ankle dorsiflexion:   4+/5   Ankle plantarflexion: 2/5 Ankle plantarflexion: 3/5   Inversion  2/5      3+/5  E#veraion  2/5      3+/5    Special Tests: Knee    Joint Mobility: hypomobile  Palpation:  Pt remains tender to palpation in the distal R achilles tendon as well as in the medial r calf.  Sensation: intact to light touch    Edema: min R ankle      Gait: Keiry ambulated 200 feet with no assistive device and decreased toe off on the R    Balance: NT      Treatment:  Pt performed there ex's for R lower leg strengthening, ROM, flexibility and endurance including:  IFC R achilles      Manual Therapy x 0 min     Soft tissue mobilization L calf    Therapeutic exercise x 0 min  Not performed.  MH with pre-modulated ES to the R achilles tendon x 15 min  Exercises were reviewed and pt was able to demonstrate them prior to the end of the session.   Dry needling L  Sural  Tibial   Local calf points including Travell tender points.  R ankle orange TB inversion, eversion and dorsiflexion 20 x 3 - band held by Therapist  R ankle plantar flexion 20 x 3 with blue TB  Ionophoresis tot the R distal achilles tendon x 20 min with Deximethazone   Kinesiotape R achilles tendon  Ed pt to use ice PRN 10- 20 min when pain or swelling occurs.     Keiry demo good understanding of the education provided. Patient demo good return demo of skill of exercises.      Assessment  This is a 72 y.o. female referred to outpatient physical therapy and presents with a medical diagnosis of   Encounter Diagnosis   Name Primary?    Achilles tendinitis of right lower extremity     and demonstrates limitations as described in the problem  list. Pt will benefit from physcial therapy services in order to maximize pain free and/or functional use of right ankle. The following goals were discussed with the patient and she is in agreement.    Pt remains tender to palpation at the distal attachment of the R achilles tendon as well as along the tendon as a whole.  She is also tender to palpation in the R medial calf.  Pt does not c/o pain with resisted R ankle inversion or eversion, but she does c/o pain with resisted plantar flexion in 3 different positions of plantar flexion indicating involvement of the gastrocnemius/soleus complex, but not of the muscles that invert or boubacar her R ankle.  Problem List: pain, decreased ROM, decreased flexibility, decreased strength, decreased motor control, antalgic gait and decreased ability to fully participate in recreational/sports related activities.      GOALS: Short Term Goals:  4 weeks  1. Pt will demonstrate increased ankle dorsiflexion AROM to 2 degrees.  2. Pt will demonstrate increased ankle plantarflexion AROM to 56 degrees.  3. Independent with HEP to increase patient's tolerance for ambulating    Long Term Goals: 8 weeks  1.Report decreased R achilles and calf pain  <   / =  2  /10 with walkming to increase tolerance for ADL's  2.Increased MMT  for  R LE  to increase tolerance for ADL activities.  3.Increased arom  for  R foot and ankle to improve normal movement patterns during ADL's. - improving  4. Pt will report improvement in overall functional abilities of LE, evidenced by improved score on FOTO survey    Plan  Pt reported that she will be out of town for the next few weeks, but plans to return to continue her Tx's.

## 2018-05-02 ENCOUNTER — CLINICAL SUPPORT (OUTPATIENT)
Dept: REHABILITATION | Facility: HOSPITAL | Age: 73
End: 2018-05-02
Attending: INTERNAL MEDICINE
Payer: COMMERCIAL

## 2018-05-02 DIAGNOSIS — M76.61 ACHILLES TENDINITIS OF RIGHT LOWER EXTREMITY: ICD-10-CM

## 2018-05-02 PROCEDURE — 97140 MANUAL THERAPY 1/> REGIONS: CPT | Mod: PO

## 2018-05-02 PROCEDURE — 97014 ELECTRIC STIMULATION THERAPY: CPT | Mod: PO

## 2018-05-04 ENCOUNTER — CLINICAL SUPPORT (OUTPATIENT)
Dept: REHABILITATION | Facility: HOSPITAL | Age: 73
End: 2018-05-04
Attending: INTERNAL MEDICINE
Payer: COMMERCIAL

## 2018-05-04 DIAGNOSIS — M76.61 ACHILLES TENDINITIS OF RIGHT LOWER EXTREMITY: ICD-10-CM

## 2018-05-04 PROCEDURE — 97014 ELECTRIC STIMULATION THERAPY: CPT | Mod: PO

## 2018-05-04 PROCEDURE — 97140 MANUAL THERAPY 1/> REGIONS: CPT | Mod: PO

## 2018-05-07 NOTE — PROGRESS NOTES
Physical Therapy Daily Note  Visit: 10    Name: Keiry Rudolph  Clinic Number: [id number]    Keiry is a 72 y.o. female treated on 05/06/2018.     Diagnosis:   Encounter Diagnosis   Name Primary?    Achilles tendinitis of right lower extremity        DOS: none    Physician: Donny Interiano MD  Treatment Orders: PT Eval and Treat    No past medical history on file.  No current outpatient prescriptions on file.     No current facility-administered medications for this visit.      Review of patient's allergies indicates:  Allergies not on file  Precautions: Standard  Occupation:       Subjective  Onset/JIMMY: sudden.  Pt woke with pain in her R heel when she got out of bed  Involved Area/Location: right  Current Symptoms: pain    Increases symptoms: walking  Decreases Symptoms: rest    Current Function: Pain in R heel with walking  Previous function: Walking pain free    Diagnostic Tests: NA    Pain Scale: Keiry rates R distal achilles pain to be 9  Patient Goals: decrease pain and ambulate without pain  Pt reported that her achilles and lateral ankle remain sore most of the time.     Objective    Functional Limitations  Reports - G Codes    Category:  Mobility   Tool:  FOTO Outcomes Measurement System.   Score:  Patient scored out 16 of 100 on the FOTO Outcomes Measurement System.   Current:  G 8978 CM   Goal:  G 8979 CK           Observation: Pt with antalgic gait and slight swelling in R ankle    Posture: WNL      Passive Range of Motion: Ankle   Left Right   Dorsiflexion: NT 5   Plantar flexion NT 54            Lower Extremity Strength - pt requested that we not muscle test here legs secondary to L hip pain  Right LE  Left LE    Knee extension: NT Knee extension: NT   Knee flexion: NT Knee flexion: NT   Hip flexion: NT Hip flexion: NT   Hip extension:  NT Hip extension: NT   Hip abduction/Glut Medius: NT Hip abduction/Glut  medius NT   Hip adduction: NT Hip adduction: NT   Hip internal rotation: NT Hip internal rotation: NT   Hip external rotation: NT Hip external rotation: NT   Ankle dorsiflexion:   4/5 Ankle dorsiflexion:   4+/5   Ankle plantarflexion: 2/5 Ankle plantarflexion: 3/5   Inversion  2/5      3+/5  E#veraion  2/5      3+/5    Special Tests: Knee    Joint Mobility: hypomobile  Palpation:  Pt remains tender to palpation in the distal R achilles tendon as well as in the medial r calf.  Sensation: intact to light touch    Edema: min R ankle      Gait: Keiry ambulated 200 feet with no assistive device and decreased toe off on the R    Balance: NT      Treatment:  Pt performed there ex's for R lower leg strengthening, ROM, flexibility and endurance including:    Modalities  IFC R achilles x 15 min      Manual Therapy x 15 min   Soft tissue mobilization L calf    Therapeutic exercise x 0 min  Not performed.  MH with pre-modulated ES to the R achilles tendon x 15 min  Exercises were reviewed and pt was able to demonstrate them prior to the end of the session.   Dry needling L  Sural  Tibial   Local calf points including Travell tender points.  R ankle orange TB inversion, eversion and dorsiflexion 20 x 3 - band held by Therapist  R ankle plantar flexion 20 x 3 with blue TB  Ionophoresis tot the R distal achilles tendon x 20 min with Deximethazone   Kinesiotape R achilles tendon  Ed pt to use ice PRN 10- 20 min when pain or swelling occurs.     Keiry demo good understanding of the education provided. Patient demo good return demo of skill of exercises.      Assessment  This is a 72 y.o. female referred to outpatient physical therapy and presents with a medical diagnosis of   Encounter Diagnosis   Name Primary?    Achilles tendinitis of right lower extremity     and demonstrates limitations as described in the problem list. Pt will benefit from physcial therapy services in order to maximize pain free and/or functional use of  right ankle. The following goals were discussed with the patient and she is in agreement.    Pt remains tender to palpation at the distal attachment of the R achilles tendon as well as her medial R calf.  Problem List: pain, decreased ROM, decreased flexibility, decreased strength, decreased motor control, antalgic gait and decreased ability to fully participate in recreational/sports related activities.      GOALS: Short Term Goals:  4 weeks  1. Pt will demonstrate increased ankle dorsiflexion AROM to 2 degrees.  2. Pt will demonstrate increased ankle plantarflexion AROM to 56 degrees.  3. Independent with HEP to increase patient's tolerance for ambulating    Long Term Goals: 8 weeks  1.Report decreased R achilles and calf pain  <   / =  2  /10 with walkming to increase tolerance for ADL's  2.Increased MMT  for  R LE  to increase tolerance for ADL activities.  3.Increased arom  for  R foot and ankle to improve normal movement patterns during ADL's. - improving  4. Pt will report improvement in overall functional abilities of LE, evidenced by improved score on FOTO survey    Plan  Pt reported that she will be out of town for the next few weeks, but plans to return to continue her Tx's.

## 2018-05-07 NOTE — PROGRESS NOTES
Physical Therapy Daily Note  Visit: 13    Name: Keiry Rudolph  Clinic Number: [id number]    Keiry is a 72 y.o. female treated on 05/06/2018.     Diagnosis:   Encounter Diagnosis   Name Primary?    Achilles tendinitis of right lower extremity        DOS: none    Physician: Donny Interiano MD  Treatment Orders: PT Eval and Treat    No past medical history on file.  No current outpatient prescriptions on file.     No current facility-administered medications for this visit.      Review of patient's allergies indicates:  Allergies not on file  Precautions: Standard  Occupation:       Subjective  Onset/JIMMY: sudden.  Pt woke with pain in her R heel when she got out of bed  Involved Area/Location: right  Current Symptoms: pain    Increases symptoms: walking  Decreases Symptoms: rest    Current Function: Pain in R heel with walking  Previous function: Walking pain free    Diagnostic Tests: NA    Pain Scale: Keiry rates R distal achilles pain to be 9  Patient Goals: decrease pain and ambulate without pain  Pt asked about Tx for her hips and knees and had suggestions from her Physician for possible Pt Tx.  She also stated that she feels as if she is getting results form interferential therapy.    Objective    Functional Limitations  Reports - G Codes    Category:  Mobility   Tool:  FOTO Outcomes Measurement System.   Score:  Patient scored out 16 of 100 on the FOTO Outcomes Measurement System.   Current:  G 8978 CM   Goal:  G 8979 CK           Observation: Pt with antalgic gait and slight swelling in R ankle    Posture: WNL      Passive Range of Motion: Ankle   Left Right   Dorsiflexion: NT 5   Plantar flexion NT 54            Lower Extremity Strength - pt requested that we not muscle test here legs secondary to L hip pain  Right LE  Left LE    Knee extension: NT Knee extension: NT   Knee flexion: NT Knee flexion: NT   Hip  flexion: NT Hip flexion: NT   Hip extension:  NT Hip extension: NT   Hip abduction/Glut Medius: NT Hip abduction/Glut medius NT   Hip adduction: NT Hip adduction: NT   Hip internal rotation: NT Hip internal rotation: NT   Hip external rotation: NT Hip external rotation: NT   Ankle dorsiflexion:   4/5 Ankle dorsiflexion:   4+/5   Ankle plantarflexion: 2/5 Ankle plantarflexion: 3/5   Inversion  2/5      3+/5  E#veraion  2/5      3+/5    Special Tests: Knee    Joint Mobility: hypomobile  Palpation:  Pt remains tender to palpation in the distal R achilles tendon as well as in the medial r calf.  Sensation: intact to light touch    Edema: min R ankle      Gait: Keiry ambulated 200 feet with no assistive device and decreased toe off on the R    Balance: NT      Treatment:  Pt performed there ex's for R lower leg strengthening, ROM, flexibility and endurance including:  Modalities:  IFC R achilles x 15 min      Manual Therapy x 15 min   Soft tissue mobilization L calf    Therapeutic exercise x 0 min  Not performed.  MH with pre-modulated ES to the R achilles tendon x 15 min  Exercises were reviewed and pt was able to demonstrate them prior to the end of the session.   Dry needling L  Sural  Tibial   Local calf points including Travell tender points.  R ankle orange TB inversion, eversion and dorsiflexion 20 x 3 - band held by Therapist  R ankle plantar flexion 20 x 3 with blue TB  Ionophoresis tot the R distal achilles tendon x 20 min with Deximethazone   Kinesiotape R achilles tendon  Ed pt to use ice PRN 10- 20 min when pain or swelling occurs.     Keiry demo good understanding of the education provided. Patient demo good return demo of skill of exercises.      Assessment  This is a 72 y.o. female referred to outpatient physical therapy and presents with a medical diagnosis of   Encounter Diagnosis   Name Primary?    Achilles tendinitis of right lower extremity     and demonstrates limitations as described in the  problem list. Pt will benefit from physcial therapy services in order to maximize pain free and/or functional use of right ankle. The following goals were discussed with the patient and she is in agreement.    Pt remains tender to palpation at the distal attachment of the R achilles tendon as well as along the tendon as a whole.  We discussed Pt's possible Tx for her knees and hips and she decided that it might be too painful to attempt it at this time.  Problem List: pain, decreased ROM, decreased flexibility, decreased strength, decreased motor control, antalgic gait and decreased ability to fully participate in recreational/sports related activities.      GOALS: Short Term Goals:  4 weeks  1. Pt will demonstrate increased ankle dorsiflexion AROM to 2 degrees.  2. Pt will demonstrate increased ankle plantarflexion AROM to 56 degrees.  3. Independent with HEP to increase patient's tolerance for ambulating    Long Term Goals: 8 weeks  1.Report decreased R achilles and calf pain  <   / =  2  /10 with walkming to increase tolerance for ADL's  2.Increased MMT  for  R LE  to increase tolerance for ADL activities.  3.Increased arom  for  R foot and ankle to improve normal movement patterns during ADL's. - improving  4. Pt will report improvement in overall functional abilities of LE, evidenced by improved score on FOTO survey    Plan  Pt reported that she will be out of town for the next few weeks, but plans to return to continue her Tx's.

## 2018-05-09 ENCOUNTER — CLINICAL SUPPORT (OUTPATIENT)
Dept: REHABILITATION | Facility: HOSPITAL | Age: 73
End: 2018-05-09
Attending: INTERNAL MEDICINE
Payer: COMMERCIAL

## 2018-05-09 DIAGNOSIS — M76.61 ACHILLES TENDINITIS OF RIGHT LOWER EXTREMITY: ICD-10-CM

## 2018-05-09 PROCEDURE — 97140 MANUAL THERAPY 1/> REGIONS: CPT | Mod: PO

## 2018-05-09 PROCEDURE — 97014 ELECTRIC STIMULATION THERAPY: CPT | Mod: PO

## 2018-05-09 NOTE — PROGRESS NOTES
Physical Therapy Daily Note  Visit: 13    Name: Keiry Rudolph  Clinic Number: [id number]    Keiry is a 72 y.o. female treated on 05/09/2018.     Diagnosis:   Encounter Diagnosis   Name Primary?    Achilles tendinitis of right lower extremity        DOS: none    Physician: Donny Interiano MD  Treatment Orders: PT Eval and Treat    No past medical history on file.  No current outpatient prescriptions on file.     No current facility-administered medications for this visit.      Review of patient's allergies indicates:  Allergies not on file  Precautions: Standard  Occupation:       Subjective  Onset/JIMMY: sudden.  Pt woke with pain in her R heel when she got out of bed  Involved Area/Location: right  Current Symptoms: pain    Increases symptoms: walking  Decreases Symptoms: rest    Current Function: Pain in R heel with walking  Previous function: Walking pain free    Diagnostic Tests: NA    Pain Scale: Keiry rates R distal achilles pain to be 9  Patient Goals: decrease pain and ambulate without pain  Pt continues to c/o R heel  pain    Objective    Functional Limitations  Reports - G Codes    Category:  Mobility   Tool:  FOTO Outcomes Measurement System.   Score:  Patient scored out 16 of 100 on the FOTO Outcomes Measurement System.   Current:  G 8978 CM   Goal:  G 8979 CK           Observation: Pt with antalgic gait and slight swelling in R ankle    Posture: WNL      Passive Range of Motion: Ankle   Left Right   Dorsiflexion: NT 5   Plantar flexion NT 54            Lower Extremity Strength - pt requested that we not muscle test here legs secondary to L hip pain  Right LE  Left LE    Knee extension: NT Knee extension: NT   Knee flexion: NT Knee flexion: NT   Hip flexion: NT Hip flexion: NT   Hip extension:  NT Hip extension: NT   Hip abduction/Glut Medius: NT Hip abduction/Glut medius NT   Hip adduction: NT Hip adduction:  NT   Hip internal rotation: NT Hip internal rotation: NT   Hip external rotation: NT Hip external rotation: NT   Ankle dorsiflexion:   4/5 Ankle dorsiflexion:   4+/5   Ankle plantarflexion: 2/5 Ankle plantarflexion: 3/5   Inversion  2/5      3+/5  E#veraion  2/5      3+/5    Special Tests: Knee    Joint Mobility: hypomobile  Palpation:  Pt remains tender to palpation in the distal R achilles tendon as well as in the medial r calf.  Sensation: intact to light touch    Edema: min R ankle      Gait: Keiry ambulated 200 feet with no assistive device and decreased toe off on the R    Balance: NT      Treatment:  Pt performed there ex's for R lower leg strengthening, ROM, flexibility and endurance including:  Modalities:  IFC and MH to R achilles/calf x 15 min      Manual Therapy x 15 min   Soft tissue mobilization L calf    Therapeutic exercise x 0 min  Not performed.  MH with pre-modulated ES to the R achilles tendon x 15 min  Exercises were reviewed and pt was able to demonstrate them prior to the end of the session.   Dry needling L  Sural  Tibial   Local calf points including Travell tender points.  R ankle orange TB inversion, eversion and dorsiflexion 20 x 3 - band held by Therapist  R ankle plantar flexion 20 x 3 with blue TB  Ionophoresis tot the R distal achilles tendon x 20 min with Deximethazone   Kinesiotape R achilles tendon  Ed pt to use ice PRN 10- 20 min when pain or swelling occurs.     Keiry demo good understanding of the education provided. Patient demo good return demo of skill of exercises.      Assessment  This is a 72 y.o. female referred to outpatient physical therapy and presents with a medical diagnosis of   Encounter Diagnosis   Name Primary?    Achilles tendinitis of right lower extremity     and demonstrates limitations as described in the problem list. Pt will benefit from physcial therapy services in order to maximize pain free and/or functional use of right ankle. The following goals  were discussed with the patient and she is in agreement.    Pt remains tender to palpation at the distal attachment of the R achilles tendon as well as along the tendon as a whole.  Problem List: pain, decreased ROM, decreased flexibility, decreased strength, decreased motor control, antalgic gait and decreased ability to fully participate in recreational/sports related activities.      GOALS: Short Term Goals:  4 weeks  1. Pt will demonstrate increased ankle dorsiflexion AROM to 2 degrees.  2. Pt will demonstrate increased ankle plantarflexion AROM to 56 degrees.  3. Independent with HEP to increase patient's tolerance for ambulating    Long Term Goals: 8 weeks  1.Report decreased R achilles and calf pain  <   / =  2  /10 with walkming to increase tolerance for ADL's  2.Increased MMT  for  R LE  to increase tolerance for ADL activities.  3.Increased arom  for  R foot and ankle to improve normal movement patterns during ADL's. - improving  4. Pt will report improvement in overall functional abilities of LE, evidenced by improved score on FOTO survey    Plan  Pt reported that she will be out of town for the next few weeks, but plans to return to continue her Tx's.

## 2018-05-22 ENCOUNTER — CLINICAL SUPPORT (OUTPATIENT)
Dept: REHABILITATION | Facility: HOSPITAL | Age: 73
End: 2018-05-22
Attending: INTERNAL MEDICINE
Payer: COMMERCIAL

## 2018-05-22 DIAGNOSIS — M76.61 ACHILLES TENDINITIS OF RIGHT LOWER EXTREMITY: ICD-10-CM

## 2018-05-22 PROCEDURE — 97140 MANUAL THERAPY 1/> REGIONS: CPT | Mod: PO

## 2018-05-22 PROCEDURE — 97014 ELECTRIC STIMULATION THERAPY: CPT | Mod: PO

## 2018-05-22 NOTE — PROGRESS NOTES
Physical Therapy Daily Note  Visit: 14    Name: Keiry Rudolph  Clinic Number: [id number]    Keiry is a 72 y.o. female treated on 05/22/2018.     Diagnosis:   Encounter Diagnosis   Name Primary?    Achilles tendinitis of right lower extremity        DOS: none    Physician: Donny Interiano MD  Treatment Orders: PT Eval and Treat    No past medical history on file.  No current outpatient prescriptions on file.     No current facility-administered medications for this visit.      Review of patient's allergies indicates:  Allergies not on file  Precautions: Standard  Occupation:       Subjective  Onset/JIMMY: sudden.  Pt woke with pain in her R heel when she got out of bed  Involved Area/Location: right  Current Symptoms: pain    Increases symptoms: walking  Decreases Symptoms: rest    Current Function: Pain in R heel with walking  Previous function: Walking pain free    Diagnostic Tests: NA    Pain Scale: Keiry rates R distal achilles pain to be 9  Patient Goals: decrease pain and ambulate without pain  Pt continues to c/o R heel  Pain.  She also stated that she has new tennis shoes and orthotics, but they are not controlling the pain in her R heel.  She also reported that she has been using celebrex to help manage the pain while she has been out of town.    Objective    Functional Limitations  Reports - G Codes    Category:  Mobility   Tool:  FOTO Outcomes Measurement System.   Score:  Patient scored out 16 of 100 on the FOTO Outcomes Measurement System.   Current:  G 8978 CM   Goal:  G 8979 CK           Observation: Pt with antalgic gait and slight swelling in R ankle    Posture: WNL      Passive Range of Motion: Ankle   Left Right   Dorsiflexion: 0 10   Plantar flexion 62 52            Lower Extremity Strength - pt requested that we not test her hip and knee musculature secondary to her fear that it may cause increased pain  in her legs  Right LE  Left LE    Knee extension: NT Knee extension: NT   Knee flexion: NT Knee flexion: NT   Hip flexion: NT Hip flexion: NT   Hip extension:  NT Hip extension: NT   Hip abduction/Glut Medius: NT Hip abduction/Glut medius NT   Hip adduction: NT Hip adduction: NT   Hip internal rotation: NT Hip internal rotation: NT   Hip external rotation: NT Hip external rotation: NT   Ankle dorsiflexion:   4/5 Ankle dorsiflexion:   4+/5   Ankle plantarflexion: 2/5 Ankle plantarflexion: 3/5   Inversion  3/5      3+/5  E#veraion  3/5      3+/5    Special Tests: Knee    Joint Mobility: hypomobile  Palpation:  Pt remains tender to palpation in the distal R achilles tendon as well as in the medial R  Pt received the following Tx for her achilles tendon:  IFC to the R achilles tendon  Soft tissue mobilization to the R calf muscle   Kinesiotape R achilles tendon      Ed pt to use ice PRN 10- 20 min when pain or swelling occurs.     Keiry demo good understanding of the education provided. Patient demo good return demo of skill of exercises.      Assessment  This is a 72 y.o. female referred to outpatient physical therapy and presents with a medical diagnosis of   Encounter Diagnosis   Name Primary?    Achilles tendinitis of right lower extremity     and demonstrates limitations as described in the problem list. Pt will benefit from physcial therapy services in order to maximize pain free and/or functional use of right ankle. The following goals were discussed with the patient and she is in agreement.    Pt remains tender to palpation at the distal attachment of the R achilles tendon as well as along the tendon as a whole.  Problem List: pain, decreased ROM, decreased flexibility, decreased strength, decreased motor control, antalgic gait and decreased ability to fully participate in recreational/sports related activities.      GOALS: Short Term Goals:  4 weeks  1. Pt will demonstrate increased ankle dorsiflexion AROM to  2 degrees.  2. Pt will demonstrate increased ankle plantarflexion AROM to 56 degrees.  3. Independent with HEP to increase patient's tolerance for ambulating    Long Term Goals: 8 weeks  1.Report decreased R achilles and calf pain  <   / =  2  /10 with walkming to increase tolerance for ADL's  2.Increased MMT  for  R LE  to increase tolerance for ADL activities.  3.Increased arom  for  R foot and ankle to improve normal movement patterns during ADL's. - improving  4. Pt will report improvement in overall functional abilities of LE, evidenced by improved score on FOTO survey    Plan  Pt reported that she will be out of town for the next few weeks, but plans to return to continue her Tx's.

## 2018-05-24 ENCOUNTER — CLINICAL SUPPORT (OUTPATIENT)
Dept: REHABILITATION | Facility: HOSPITAL | Age: 73
End: 2018-05-24
Attending: INTERNAL MEDICINE
Payer: COMMERCIAL

## 2018-05-24 DIAGNOSIS — M76.61 ACHILLES TENDINITIS OF RIGHT LOWER EXTREMITY: ICD-10-CM

## 2018-05-29 ENCOUNTER — CLINICAL SUPPORT (OUTPATIENT)
Dept: REHABILITATION | Facility: HOSPITAL | Age: 73
End: 2018-05-29
Payer: COMMERCIAL

## 2018-05-29 DIAGNOSIS — M76.61 ACHILLES TENDINITIS OF RIGHT LOWER EXTREMITY: ICD-10-CM

## 2018-05-29 NOTE — PROGRESS NOTES
Physical Therapy Daily Note  Visit: 14    Name: Keiry Rudolph  Clinic Number: [id number]    Keiry is a 72 y.o. female treated on 05/29/2018.     Diagnosis:   Encounter Diagnosis   Name Primary?    Achilles tendinitis of right lower extremity        DOS: none    Physician: Donny Interiano MD  Treatment Orders: PT Eval and Treat    No past medical history on file.  No current outpatient prescriptions on file.     No current facility-administered medications for this visit.      Review of patient's allergies indicates:  Allergies not on file  Precautions: Standard  Occupation:       Subjective  Onset/JIMMY: sudden.  Pt woke with pain in her R heel when she got out of bed  Involved Area/Location: right  Current Symptoms: pain    Increases symptoms: walking  Decreases Symptoms: rest    Current Function: Pain in R heel with walking  Previous function: Walking pain free    Diagnostic Tests: NA    Pain Scale: Keiry rates R distal achilles pain to be 9  Patient Goals: decrease pain and ambulate without pain  Pt continues to c/o R heel  Pain.  She also stated that she has new tennis shoes and orthotics, but they are not controlling the pain in her R heel.  She also reported that she has been using celebrex to help manage the pain while she has been out of town.    Objective    Functional Limitations  Reports - G Codes    Category:  Mobility   Tool:  FOTO Outcomes Measurement System.   Score:  Patient scored out 16 of 100 on the FOTO Outcomes Measurement System.   Current:  G 8978 CM   Goal:  G 8979 CK           Observation: Pt with antalgic gait and slight swelling in R ankle    Posture: WNL      Passive Range of Motion: Ankle   Left Right   Dorsiflexion: 0 10   Plantar flexion 62 52            Lower Extremity Strength - pt requested that we not test her hip and knee musculature secondary to her fear that it may cause increased pain  in her legs  Right LE  Left LE    Knee extension: NT Knee extension: NT   Knee flexion: NT Knee flexion: NT   Hip flexion: NT Hip flexion: NT   Hip extension:  NT Hip extension: NT   Hip abduction/Glut Medius: NT Hip abduction/Glut medius NT   Hip adduction: NT Hip adduction: NT   Hip internal rotation: NT Hip internal rotation: NT   Hip external rotation: NT Hip external rotation: NT   Ankle dorsiflexion:   4/5 Ankle dorsiflexion:   4+/5   Ankle plantarflexion: 2/5 Ankle plantarflexion: 3/5   Inversion  3/5      3+/5  E#veraion  3/5      3+/5    Special Tests: Knee    Joint Mobility: hypomobile  Palpation:  Pt remains tender to palpation in the distal R achilles tendon as well as in the medial R  Pt received the following Tx for her achilles tendon:  IFC to the R achilles tendon  Soft tissue mobilization to the R calf muscle   Kinesiotape R achilles tendon      Ed pt to use ice PRN 10- 20 min when pain or swelling occurs.     Keiry demo good understanding of the education provided. Patient demo good return demo of skill of exercises.      Assessment  This is a 72 y.o. female referred to outpatient physical therapy and presents with a medical diagnosis of   Encounter Diagnosis   Name Primary?    Achilles tendinitis of right lower extremity     and demonstrates limitations as described in the problem list. Pt will benefit from physcial therapy services in order to maximize pain free and/or functional use of right ankle. The following goals were discussed with the patient and she is in agreement.    Pt remains tender to palpation at the distal attachment of the R achilles tendon as well as along the tendon as a whole.  Problem List: pain, decreased ROM, decreased flexibility, decreased strength, decreased motor control, antalgic gait and decreased ability to fully participate in recreational/sports related activities.      GOALS: Short Term Goals:  4 weeks  1. Pt will demonstrate increased ankle dorsiflexion AROM to  2 degrees.  2. Pt will demonstrate increased ankle plantarflexion AROM to 56 degrees.  3. Independent with HEP to increase patient's tolerance for ambulating    Long Term Goals: 8 weeks  1.Report decreased R achilles and calf pain  <   / =  2  /10 with walkming to increase tolerance for ADL's  2.Increased MMT  for  R LE  to increase tolerance for ADL activities.  3.Increased arom  for  R foot and ankle to improve normal movement patterns during ADL's. - improving  4. Pt will report improvement in overall functional abilities of LE, evidenced by improved score on FOTO survey    Plan  Pt reported that she will be out of town for the next few weeks, but plans to return to continue her Tx's.

## 2018-05-30 ENCOUNTER — CLINICAL SUPPORT (OUTPATIENT)
Dept: REHABILITATION | Facility: HOSPITAL | Age: 73
End: 2018-05-30
Attending: INTERNAL MEDICINE
Payer: COMMERCIAL

## 2018-05-30 DIAGNOSIS — M76.61 ACHILLES TENDINITIS OF RIGHT LOWER EXTREMITY: ICD-10-CM

## 2018-06-05 ENCOUNTER — CLINICAL SUPPORT (OUTPATIENT)
Dept: REHABILITATION | Facility: HOSPITAL | Age: 73
End: 2018-06-05
Attending: INTERNAL MEDICINE
Payer: COMMERCIAL

## 2018-06-05 DIAGNOSIS — M76.61 ACHILLES TENDINITIS OF RIGHT LOWER EXTREMITY: ICD-10-CM

## 2018-06-07 ENCOUNTER — CLINICAL SUPPORT (OUTPATIENT)
Dept: REHABILITATION | Facility: HOSPITAL | Age: 73
End: 2018-06-07
Attending: INTERNAL MEDICINE
Payer: COMMERCIAL

## 2018-06-07 DIAGNOSIS — M76.61 ACHILLES TENDINITIS OF RIGHT LOWER EXTREMITY: ICD-10-CM

## 2018-06-12 ENCOUNTER — CLINICAL SUPPORT (OUTPATIENT)
Dept: REHABILITATION | Facility: HOSPITAL | Age: 73
End: 2018-06-12
Attending: INTERNAL MEDICINE
Payer: COMMERCIAL

## 2018-06-12 DIAGNOSIS — M76.61 ACHILLES TENDINITIS OF RIGHT LOWER EXTREMITY: ICD-10-CM

## 2018-06-12 PROCEDURE — 97140 MANUAL THERAPY 1/> REGIONS: CPT | Mod: PO

## 2018-06-12 PROCEDURE — 97014 ELECTRIC STIMULATION THERAPY: CPT | Mod: PO

## 2018-06-13 NOTE — PROGRESS NOTES
Physical Therapy Daily Note  Visit: 6    Name: Keiry Rudolph  Clinic Number: [id number]    Keiry is a 72 y.o. female treated on 06/12/2018.     Diagnosis:   Encounter Diagnosis   Name Primary?    Achilles tendinitis of right lower extremity        DOS: none    Physician: Donny Interiano MD  Treatment Orders: PT Eval and Treat    No past medical history on file.  No current outpatient prescriptions on file.     No current facility-administered medications for this visit.      Review of patient's allergies indicates:  Allergies not on file  Precautions: Standard  Occupation:       Subjective  Onset/JIMMY: sudden.  Pt woke with pain in her R heel when she got out of bed  Involved Area/Location: right  Current Symptoms: pain    Increases symptoms: walking  Decreases Symptoms: rest    Current Function: Pain in R heel with walking  Previous function: Walking pain free    Diagnostic Tests: NA    Pain Scale: Keiry rates R distal achilles pain to be 9  Patient Goals: decrease pain and ambulate without pain  Pt continues to c/o R heel pain, but reports that she feels like she is getting better.    Objective    Functional Limitations  Reports - G Codes    Category:  Mobility   Tool:  FOTO Outcomes Measurement System.   Score:  Patient scored out 16 of 100 on the FOTO Outcomes Measurement System.   Current:  G 8978 CM   Goal:  G 8979 CK           Observation: Pt with antalgic gait and slight swelling in R ankle    Posture: WNL      Passive Range of Motion: Ankle   Left Right   Dorsiflexion: 0 10   Plantar flexion 62 52            Lower Extremity Strength - pt requested that we not test her hip and knee musculature secondary to her fear that it may cause increased pain in her legs  Right LE  Left LE    Knee extension: NT Knee extension: NT   Knee flexion: NT Knee flexion: NT   Hip flexion: NT Hip flexion: NT   Hip extension:  NT Hip  extension: NT   Hip abduction/Glut Medius: NT Hip abduction/Glut medius NT   Hip adduction: NT Hip adduction: NT   Hip internal rotation: NT Hip internal rotation: NT   Hip external rotation: NT Hip external rotation: NT   Ankle dorsiflexion:   4/5 Ankle dorsiflexion:   4+/5   Ankle plantarflexion: 2/5 Ankle plantarflexion: 3/5   Inversion  3/5      3+/5  E#veraion  3/5      3+/5    Special Tests: Knee    Joint Mobility: hypomobile  Palpation:  Pt remains tender to palpation in the distal R achilles tendon as well as in the medial R  Pt received the following Tx for her achilles tendon:  IFC to the R achilles tendon  Soft tissue mobilization to the R calf muscle   Kinesiotape R achilles tendon      Ed pt to use ice PRN 10- 20 min when pain or swelling occurs.     Keiry demo good understanding of the education provided. Patient demo good return demo of skill of exercises.      Assessment  This is a 72 y.o. female referred to outpatient physical therapy and presents with a medical diagnosis of   Encounter Diagnosis   Name Primary?    Achilles tendinitis of right lower extremity     and demonstrates limitations as described in the problem list. Pt will benefit from physcial therapy services in order to maximize pain free and/or functional use of right ankle. The following goals were discussed with the patient and she is in agreement.    Pt remains tender to palpation at the distal attachment of the R achilles tendon as well as along the tendon as a whole.  Problem List: pain, decreased ROM, decreased flexibility, decreased strength, decreased motor control, antalgic gait and decreased ability to fully participate in recreational/sports related activities.      GOALS: Short Term Goals:  4 weeks  1. Pt will demonstrate increased ankle dorsiflexion AROM to 2 degrees.  2. Pt will demonstrate increased ankle plantarflexion AROM to 56 degrees.  3. Independent with HEP to increase patient's tolerance for ambulating    Long  Term Goals: 8 weeks  1.Report decreased R achilles and calf pain  <   / =  2  /10 with walkming to increase tolerance for ADL's  2.Increased MMT  for  R LE  to increase tolerance for ADL activities.  3.Increased arom  for  R foot and ankle to improve normal movement patterns during ADL's. - improving  4. Pt will report improvement in overall functional abilities of LE, evidenced by improved score on FOTO survey    Plan  Pt reported that she will be out of town for the next few weeks, but plans to return to continue her Tx's.

## 2018-06-27 ENCOUNTER — CLINICAL SUPPORT (OUTPATIENT)
Dept: REHABILITATION | Facility: HOSPITAL | Age: 73
End: 2018-06-27
Attending: INTERNAL MEDICINE
Payer: COMMERCIAL

## 2018-06-27 DIAGNOSIS — M76.61 ACHILLES TENDINITIS OF RIGHT LOWER EXTREMITY: ICD-10-CM

## 2018-06-27 PROCEDURE — 97014 ELECTRIC STIMULATION THERAPY: CPT | Mod: PO

## 2018-06-27 PROCEDURE — 97140 MANUAL THERAPY 1/> REGIONS: CPT | Mod: PO

## 2018-06-27 NOTE — PROGRESS NOTES
Physical Therapy Daily Note  Visit: 7    Name: Keiry Rudolph  Clinic Number: [id number]    Keiry is a 72 y.o. female treated on 06/27/2018.     Diagnosis:   Encounter Diagnosis   Name Primary?    Achilles tendinitis of right lower extremity        DOS: none    Physician: Donny Interiano MD  Treatment Orders: PT Eval and Treat    No past medical history on file.  No current outpatient prescriptions on file.     No current facility-administered medications for this visit.      Review of patient's allergies indicates:  Allergies not on file  Precautions: Standard  Occupation:       Subjective  Onset/JIMMY: sudden.  Pt woke with pain in her R heel when she got out of bed  Involved Area/Location: right  Current Symptoms: pain    Increases symptoms: walking  Decreases Symptoms: rest    Current Function: Pain in R heel with walking  Previous function: Walking pain free    Diagnostic Tests: NA    Pain Scale: Keiry rates R distal achilles pain to be 9  Patient Goals: decrease pain and ambulate without pain  Pt reported that she missed appointment yesterday because she was with her  in the hospital.    Objective    Functional Limitations  Reports - G Codes    Category:  Mobility   Tool:  FOTO Outcomes Measurement System.   Score:  Patient scored out 16 of 100 on the FOTO Outcomes Measurement System.   Current:  G 8978 CM   Goal:  G 8979 CK           Observation: Pt with antalgic gait and slight swelling in R ankle    Posture: WNL      Passive Range of Motion: Ankle   Left Right   Dorsiflexion: 0 10   Plantar flexion 62 52            Lower Extremity Strength - pt requested that we not test her hip and knee musculature secondary to her fear that it may cause increased pain in her legs  Right LE  Left LE    Knee extension: NT Knee extension: NT   Knee flexion: NT Knee flexion: NT   Hip flexion: NT Hip flexion: NT   Hip  extension:  NT Hip extension: NT   Hip abduction/Glut Medius: NT Hip abduction/Glut medius NT   Hip adduction: NT Hip adduction: NT   Hip internal rotation: NT Hip internal rotation: NT   Hip external rotation: NT Hip external rotation: NT   Ankle dorsiflexion:   4/5 Ankle dorsiflexion:   4+/5   Ankle plantarflexion: 2/5 Ankle plantarflexion: 3/5   Inversion  3/5      3+/5  E#veraion  3/5      3+/5    Special Tests: Knee    Joint Mobility: hypomobile  Palpation:  Pt remains tender to palpation in the distal R achilles tendon as well as in the medial R  Pt received the following Tx for her achilles tendon:  IFC to the R achilles tendon  Soft tissue mobilization to the R calf muscle   Kinesiotape R achilles tendon - np      Ed pt to use ice PRN 10- 20 min when pain or swelling occurs.     Keiry demo good understanding of the education provided. Patient demo good return demo of skill of exercises.      Assessment  This is a 72 y.o. female referred to outpatient physical therapy and presents with a medical diagnosis of   Encounter Diagnosis   Name Primary?    Achilles tendinitis of right lower extremity     and demonstrates limitations as described in the problem list. Pt will benefit from physcial therapy services in order to maximize pain free and/or functional use of right ankle. The following goals were discussed with the patient and she is in agreement.    Pt remains tender to palpation at the distal attachment of the R achilles tendon as well as along the tendon as a whole.  Problem List: pain, decreased ROM, decreased flexibility, decreased strength, decreased motor control, antalgic gait and decreased ability to fully participate in recreational/sports related activities.      GOALS: Short Term Goals:  4 weeks  1. Pt will demonstrate increased ankle dorsiflexion AROM to 2 degrees.  2. Pt will demonstrate increased ankle plantarflexion AROM to 56 degrees.  3. Independent with HEP to increase patient's tolerance  for ambulating    Long Term Goals: 8 weeks  1.Report decreased R achilles and calf pain  <   / =  2  /10 with walkming to increase tolerance for ADL's  2.Increased MMT  for  R LE  to increase tolerance for ADL activities.  3.Increased arom  for  R foot and ankle to improve normal movement patterns during ADL's. - improving  4. Pt will report improvement in overall functional abilities of LE, evidenced by improved score on FOTO survey    Plan  Pt reported that she will be out of town for the next few weeks, but plans to return to continue her Tx's.       Authored by Resident/PA/NP

## 2018-07-05 ENCOUNTER — CLINICAL SUPPORT (OUTPATIENT)
Dept: REHABILITATION | Facility: HOSPITAL | Age: 73
End: 2018-07-05
Attending: INTERNAL MEDICINE
Payer: COMMERCIAL

## 2018-07-05 DIAGNOSIS — M76.61 ACHILLES TENDINITIS OF RIGHT LOWER EXTREMITY: ICD-10-CM

## 2018-07-05 PROCEDURE — 97014 ELECTRIC STIMULATION THERAPY: CPT | Mod: PO

## 2018-07-05 PROCEDURE — 97140 MANUAL THERAPY 1/> REGIONS: CPT | Mod: PO

## 2018-07-05 NOTE — PROGRESS NOTES
Physical Therapy Daily Note  Visit: 8    Name: Keiry Rudolph  Clinic Number: [id number]    Keiry is a 72 y.o. female treated on 07/05/2018.     Diagnosis:   Encounter Diagnosis   Name Primary?    Achilles tendinitis of right lower extremity        DOS: none    Physician: Donny Interiano MD  Treatment Orders: PT Eval and Treat    No past medical history on file.  No current outpatient prescriptions on file.     No current facility-administered medications for this visit.      Review of patient's allergies indicates:  Allergies not on file  Precautions: Standard  Occupation:       Subjective  Onset/JIMMY: sudden.  Pt woke with pain in her R heel when she got out of bed  Involved Area/Location: right  Current Symptoms: pain    Increases symptoms: walking  Decreases Symptoms: rest    Current Function: Pain in R heel with walking  Previous function: Walking pain free    Diagnostic Tests: NA    Pain Scale: Keiry rates R distal achilles pain to be 9  Patient Goals: decrease pain and ambulate without pain  Pt with continued c/o R calf and heel pain.  She reports that she feels that it gets better when she is able to come for Tx consistently.    Objective    Functional Limitations  Reports - G Codes    Category:  Mobility   Tool:  FOTO Outcomes Measurement System.   Score:  Patient scored out 16 of 100 on the FOTO Outcomes Measurement System.   Current:  G 8978 CM   Goal:  G 8979 CK           Observation: Pt with antalgic gait and slight swelling in R ankle    Posture: WNL      Passive Range of Motion: Ankle   Left Right   Dorsiflexion: 0 10   Plantar flexion 62 52            Lower Extremity Strength - pt requested that we not test her hip and knee musculature secondary to her fear that it may cause increased pain in her legs  Right LE  Left LE    Knee extension: NT Knee extension: NT   Knee flexion: NT Knee flexion: NT   Hip  flexion: NT Hip flexion: NT   Hip extension:  NT Hip extension: NT   Hip abduction/Glut Medius: NT Hip abduction/Glut medius NT   Hip adduction: NT Hip adduction: NT   Hip internal rotation: NT Hip internal rotation: NT   Hip external rotation: NT Hip external rotation: NT   Ankle dorsiflexion:   4/5 Ankle dorsiflexion:   4+/5   Ankle plantarflexion: 2/5 Ankle plantarflexion: 3/5   Inversion  3/5      3+/5  E#veraion  3/5      3+/5    Special Tests: Knee    Joint Mobility: hypomobile  Palpation:  Pt remains tender to palpation in the distal R achilles tendon as well as in the medial R  Pt received the following Tx for her achilles tendon:  IFC to the R achilles tendon  Soft tissue mobilization to the R calf muscle   Kinesiotape R achilles tendon       Ed pt to use ice PRN 10- 20 min when pain or swelling occurs.     Keiry demo good understanding of the education provided. Patient demo good return demo of skill of exercises.      Assessment  This is a 72 y.o. female referred to outpatient physical therapy and presents with a medical diagnosis of   Encounter Diagnosis   Name Primary?    Achilles tendinitis of right lower extremity     and demonstrates limitations as described in the problem list. Pt will benefit from physcial therapy services in order to maximize pain free and/or functional use of right ankle. The following goals were discussed with the patient and she is in agreement.    Pt remains tender to palpation at the distal attachment of the R achilles tendon as well as along the medial aspect of the R calf   Problem List: pain, decreased ROM, decreased flexibility, decreased strength, decreased motor control, antalgic gait and decreased ability to fully participate in recreational/sports related activities.      GOALS: Short Term Goals:  4 weeks  1. Pt will demonstrate increased ankle dorsiflexion AROM to 2 degrees.  2. Pt will demonstrate increased ankle plantarflexion AROM to 56 degrees.  3. Independent  with HEP to increase patient's tolerance for ambulating    Long Term Goals: 8 weeks  1.Report decreased R achilles and calf pain  <   / =  2  /10 with walkming to increase tolerance for ADL's  2.Increased MMT  for  R LE  to increase tolerance for ADL activities.  3.Increased arom  for  R foot and ankle to improve normal movement patterns during ADL's. - improving  4. Pt will report improvement in overall functional abilities of LE, evidenced by improved score on FOTO survey    Plan  Pt reported that she will be out of town for the next few weeks, but plans to return to continue her Tx's.

## 2018-07-06 ENCOUNTER — CLINICAL SUPPORT (OUTPATIENT)
Dept: REHABILITATION | Facility: HOSPITAL | Age: 73
End: 2018-07-06
Attending: INTERNAL MEDICINE
Payer: COMMERCIAL

## 2018-07-06 DIAGNOSIS — M76.61 ACHILLES TENDINITIS OF RIGHT LOWER EXTREMITY: ICD-10-CM

## 2018-07-06 PROCEDURE — 97014 ELECTRIC STIMULATION THERAPY: CPT | Mod: PO

## 2018-07-06 PROCEDURE — 97140 MANUAL THERAPY 1/> REGIONS: CPT | Mod: PO

## 2018-07-06 NOTE — PROGRESS NOTES
Physical Therapy Daily Note  Visit: 13    Name: Keiry Rudolph  Clinic Number: [id number]    Keiry is a 72 y.o. female treated on 07/06/2018.     Diagnosis:   Encounter Diagnosis   Name Primary?    Achilles tendinitis of right lower extremity        DOS: none    Physician: Donny Interiano MD  Treatment Orders: PT Eval and Treat    No past medical history on file.  No current outpatient prescriptions on file.     No current facility-administered medications for this visit.      Review of patient's allergies indicates:  Allergies not on file  Precautions: Standard  Occupation:       Subjective  Onset/JIMMY: sudden.  Pt woke with pain in her R heel when she got out of bed  Involved Area/Location: right  Current Symptoms: pain    Increases symptoms: walking  Decreases Symptoms: rest    Current Function: Pain in R heel with walking  Previous function: Walking pain free    Diagnostic Tests: NA    Pain Scale: Keiry rates R distal achilles pain to be 9 at worse  Patient Goals: decrease pain and ambulate without pain  Pt reported continued pain in her R achilles and calf.  She stated that she is afraid that exercise will cause more pain.    Objective    Functional Limitations  Reports - G Codes    Category:  Mobility   Tool:  FOTO Outcomes Measurement System.   Score:  Patient scored out 16 of 100 on the FOTO Outcomes Measurement System.   Current:  G 8978 CM   Goal:  G 8979 CK           Observation: Pt with antalgic gait and slight swelling in R ankle    Posture: WNL      Passive Range of Motion: Ankle not reassessed today   Left Right   Dorsiflexion: 0 10   Plantar flexion 62 52            Lower Extremity Strength - pt requested that we not test her hip and knee musculature secondary to her fear that it may cause increased pain in her legs  Right LE  Left LE    Knee extension: NT Knee extension: NT   Knee flexion: NT Knee flexion:  NT   Hip flexion: NT Hip flexion: NT   Hip extension:  NT Hip extension: NT   Hip abduction/Glut Medius: NT Hip abduction/Glut medius NT   Hip adduction: NT Hip adduction: NT   Hip internal rotation: NT Hip internal rotation: NT   Hip external rotation: NT Hip external rotation: NT   Ankle dorsiflexion:   4/5 Ankle dorsiflexion:   4+/5   Ankle plantarflexion: 2/5 Ankle plantarflexion: 3/5   Inversion  3/5      3+/5  Everaion  3/5      3+/5    Special Tests: Knee    Joint Mobility: hypomobile  Palpation:  Pt remains tender to palpation in the distal R achilles tendon as well as in the medial R  Pt received the following Tx for her achilles tendon:  IFC to the R achilles tendon  Soft tissue mobilization to the R calf muscle   Kinesiotape R achilles tendon - np      Ed pt to use ice PRN 10- 20 min when pain or swelling occurs.     Keiry demo good understanding of the education provided. Patient demo good return demo of skill of exercises.      Assessment  This is a 72 y.o. female referred to outpatient physical therapy and presents with a medical diagnosis of   Encounter Diagnosis   Name Primary?    Achilles tendinitis of right lower extremity     and demonstrates limitations as described in the problem list. Pt will benefit from physcial therapy services in order to maximize pain free and/or functional use of right ankle. The following goals were discussed with the patient and she is in agreement.    Pt remains tender to palpation at the distal attachment of the R achilles tendon and throughout her entire R calf today.  Problem List: pain, decreased ROM, decreased flexibility, decreased strength, decreased motor control, antalgic gait and decreased ability to fully participate in recreational/sports related activities.      GOALS: Short Term Goals:  4 weeks  1. Pt will demonstrate increased ankle dorsiflexion AROM to 2 degrees.  2. Pt will demonstrate increased ankle plantarflexion AROM to 56 degrees.  3.  Independent with HEP to increase patient's tolerance for ambulating    Long Term Goals: 8 weeks  1.Report decreased R achilles and calf pain  <   / =  2  /10 with walkming to increase tolerance for ADL's  2.Increased MMT  for  R LE  to increase tolerance for ADL activities.  3.Increased arom  for  R foot and ankle to improve normal movement patterns during ADL's. - improving  4. Pt will report improvement in overall functional abilities of LE, evidenced by improved score on FOTO survey    Plan  Pt reported that she will be out of town for the next few weeks, but plans to return to continue her Tx's.

## 2018-12-27 NOTE — PROGRESS NOTES
Physical Therapy Daily Note  Visit: 14    Name: Keiry Rudolph  Clinic Number: [id number]    Keiry is a 73 y.o. female treated on 6/7/2018.     Diagnosis:   Encounter Diagnosis   Name Primary?    Achilles tendinitis of right lower extremity        DOS: none    Physician: Donny Interiano MD  Treatment Orders: PT Eval and Treat    No past medical history on file.  No current outpatient medications on file.     No current facility-administered medications for this visit.      Review of patient's allergies indicates:  Allergies not on file  Precautions: Standard  Occupation:       Subjective  Onset/JIMMY: sudden.  Pt woke with pain in her R heel when she got out of bed  Involved Area/Location: right  Current Symptoms: pain    Increases symptoms: walking  Decreases Symptoms: rest    Current Function: Pain in R heel with walking  Previous function: Walking pain free    Diagnostic Tests: NA    Pain Scale: Keiry rates R distal achilles pain to be 9  Patient Goals: decrease pain and ambulate without pain  Pt continues to c/o R heel  Pain.  She also stated that she has new tennis shoes and orthotics, but they are not controlling the pain in her R heel.  She also reported that she has been using celebrex to help manage the pain while she has been out of town.    Objective    Functional Limitations  Reports - G Codes    Category:  Mobility   Tool:  FOTO Outcomes Measurement System.   Score:  Patient scored out 16 of 100 on the FOTO Outcomes Measurement System.   Current:  G 8978 CM   Goal:  G 8979 CK           Observation: Pt with antalgic gait and slight swelling in R ankle    Posture: WNL      Passive Range of Motion: Ankle   Left Right   Dorsiflexion: 0 10   Plantar flexion 62 52            Lower Extremity Strength - pt requested that we not test her hip and knee musculature secondary to her fear that it may cause increased pain in  her legs  Right LE  Left LE    Knee extension: NT Knee extension: NT   Knee flexion: NT Knee flexion: NT   Hip flexion: NT Hip flexion: NT   Hip extension:  NT Hip extension: NT   Hip abduction/Glut Medius: NT Hip abduction/Glut medius NT   Hip adduction: NT Hip adduction: NT   Hip internal rotation: NT Hip internal rotation: NT   Hip external rotation: NT Hip external rotation: NT   Ankle dorsiflexion:   4/5 Ankle dorsiflexion:   4+/5   Ankle plantarflexion: 2/5 Ankle plantarflexion: 3/5   Inversion  3/5      3+/5  E#veraion  3/5      3+/5    Special Tests: Knee    Joint Mobility: hypomobile  Palpation:  Pt remains tender to palpation in the distal R achilles tendon as well as in the medial R  Pt received the following Tx for her achilles tendon:  IFC to the R achilles tendon  Soft tissue mobilization to the R calf muscle   Kinesiotape R achilles tendon      Ed pt to use ice PRN 10- 20 min when pain or swelling occurs.     Keiry demo good understanding of the education provided. Patient demo good return demo of skill of exercises.      Assessment  This is a 73 y.o. female referred to outpatient physical therapy and presents with a medical diagnosis of   Encounter Diagnosis   Name Primary?    Achilles tendinitis of right lower extremity     and demonstrates limitations as described in the problem list. Pt will benefit from physcial therapy services in order to maximize pain free and/or functional use of right ankle. The following goals were discussed with the patient and she is in agreement.    Pt remains tender to palpation at the distal attachment of the R achilles tendon as well as along the tendon as a whole.  Problem List: pain, decreased ROM, decreased flexibility, decreased strength, decreased motor control, antalgic gait and decreased ability to fully participate in recreational/sports related activities.      GOALS: Short Term Goals:  4 weeks  1. Pt will demonstrate increased ankle dorsiflexion AROM to 2  degrees.  2. Pt will demonstrate increased ankle plantarflexion AROM to 56 degrees.  3. Independent with HEP to increase patient's tolerance for ambulating    Long Term Goals: 8 weeks  1.Report decreased R achilles and calf pain  <   / =  2  /10 with walkming to increase tolerance for ADL's  2.Increased MMT  for  R LE  to increase tolerance for ADL activities.  3.Increased arom  for  R foot and ankle to improve normal movement patterns during ADL's. - improving  4. Pt will report improvement in overall functional abilities of LE, evidenced by improved score on FOTO survey    Plan  Pt reported that she will be out of town for the next few weeks, but plans to return to continue her Tx's.

## 2018-12-27 NOTE — PROGRESS NOTES
Physical Therapy Daily Note  Visit: 3    Name: Keiry Rudolph  Clinic Number: [id number]    Keiry is a 73 y.o. female evaluated on 2/23/2018    Diagnosis:   Encounter Diagnosis   Name Primary?    Achilles tendinitis of right lower extremity        DOS: none    Physician: Donny Interiano MD  Treatment Orders: PT Eval and Treat    No past medical history on file.  No current outpatient medications on file.     No current facility-administered medications for this visit.      Review of patient's allergies indicates:  Allergies not on file  Precautions: Standard  Occupation:       Subjective  Onset/JIMMY: sudden.  Pt woke with pain in her R heel when she got out of bed  Involved Area/Location: right  Current Symptoms: pain    Increases symptoms: walking  Decreases Symptoms: rest    Current Function: Pain in R heel with walking  Previous function: Walking pain free    Diagnostic Tests: NA    Pain Scale: Keiry rates R distal achilles pain to be 9  Patient Goals: decrease pain and ambulate without pain  Pt reported that her achilles has been very sore.    Objective    Functional Limitations  Reports - G Codes    Category:  Mobility   Tool:  FOTO Outcomes Measurement System.   Score:  Patient scored out 10 of 100 on the FOTO Outcomes Measurement System.   Current:  G 8978 CM   Goal:  G 8979 CK           Observation: Pt with antalgic gait and mild swelling in R ankle    Posture: WNL      Passive Range of Motion: Ankle   Left Right   Dorsiflexion: NT 28   Plantar flexion NT 52            Lower Extremity Strength  Right LE  Left LE    Knee extension: NT Knee extension: NT   Knee flexion: NT Knee flexion: NT   Hip flexion: NT Hip flexion: NT   Hip extension:  NT Hip extension: NT   Hip abduction/Glut Medius: NT Hip abduction/Glut medius NT   Hip adduction: NT Hip adduction: NT   Hip internal rotation: NT Hip internal rotation: NT   Hip  external rotation: NT Hip external rotation: NT   Ankle dorsiflexion:   4/5 Ankle dorsiflexion:   4+/5   Ankle plantarflexion: NT Ankle plantarflexion: NT       Special Tests: Knee    Joint Mobility: hypomobile  Palpation:  Pt is TTP in the R achilles and toe flexor tendons as well as the general calf muscle on the R  Sensation: intact to light touch    Edema: min R ankle      Gait: Keiry ambulated 200 feet with no assistive device and decreased toe off on the R    Balance: NT    PT Evaluation Completed? Yes  Discussed Plan of Care with patient: Yes    Treatment:  Pt performed there ex's for R lower leg strengthening, ROM, flexibility and endurance including:  MH with pre-modulated ES to the R achilles tendon x 15 min    Manual Therapy x 30 min  Soft tissue mobilization L calf  Dry needling L  Sural  Tibial   Local achilles points    Exercises were reviewed and pt was able to demonstrate them prior to the end of the session.     Ed pt to use ice PRN 10- 20 min when pain or swelling occurs.     Keiry demo good understanding of the education provided. Patient demo good return demo of skill of exercises.      Assessment  This is a 73 y.o. female referred to outpatient physical therapy and presents with a medical diagnosis of   Encounter Diagnosis   Name Primary?    Achilles tendinitis of right lower extremity     and demonstrates limitations as described in the problem list. Pt will benefit from physcial therapy services in order to maximize pain free and/or functional use of right ankle. The following goals were discussed with the patient and patient is in agreement with them as to be addressed in the treatment plan.     Problem List: pain, decreased ROM, decreased flexibility, decreased strength, decreased motor control, antalgic gait and decreased ability to fully participate in recreational/sports related activities.      GOALS: Short Term Goals:  4 weeks  1. Pt will demonstrate increased ankle dorsiflexion AROM  to 20 degrees.  2. Pt will demonstrate increased ankle plantarflexion AROM to 56 degrees.  3. Independent with HEP to increase patient's tolerance for ambulating    Long Term Goals: 8 weeks  1.Report decreased R achilles and calf pain  <   / =  2  /10 with walkming to increase tolerance for ADL's  2.Increased MMT  for  R LE  to increase tolerance for ADL activities.  3.Increased arom  for  R foot and ankle to improve normal movement patterns during ADL's.  4. Pt will report improvement in overall functional abilities of LE, evidenced by improved score on FOTO survey    Plan  Pt will be treated by physical therapy 2-3 times a week for 8 weeks for Pt Education, HEP, therapeutic exercises, neuromuscular re-education/ balance exercises, joint mobilizations, modalities prn   to achieve established goals. Keiry may at times be seen by a PTA as part of the Rehab Team.     Cont PT for  8         weeks.

## 2018-12-27 NOTE — PROGRESS NOTES
Physical Therapy Daily Note  Visit: 15    Name: Keiry Rudolph  Clinic Number: [id number]    Keiry is a 73 y.o. female treated on 6/5/2018.     Diagnosis:   Encounter Diagnosis   Name Primary?    Achilles tendinitis of right lower extremity        DOS: none    Physician: Donny Interiano MD  Treatment Orders: PT Eval and Treat    No past medical history on file.  No current outpatient medications on file.     No current facility-administered medications for this visit.      Review of patient's allergies indicates:  Allergies not on file  Precautions: Standard  Occupation:       Subjective  Onset/JIMMY: sudden.  Pt woke with pain in her R heel when she got out of bed  Involved Area/Location: right  Current Symptoms: pain    Increases symptoms: walking  Decreases Symptoms: rest    Current Function: Pain in R heel with walking  Previous function: Walking pain free    Diagnostic Tests: NA    Pain Scale: Keiry rates R distal achilles pain to be 9  Patient Goals: decrease pain and ambulate without pain  Pt continues to c/o R heel  Pain.  She also stated that she has new tennis shoes and orthotics, but they are not controlling the pain in her R heel.  She also reported that she has been using celebrex to help manage the pain while she has been out of town.    Objective    Functional Limitations  Reports - G Codes    Category:  Mobility   Tool:  FOTO Outcomes Measurement System.   Score:  Patient scored out 16 of 100 on the FOTO Outcomes Measurement System.   Current:  G 8978 CM   Goal:  G 8979 CK           Observation: Pt with antalgic gait and slight swelling in R ankle    Posture: WNL      Passive Range of Motion: Ankle   Left Right   Dorsiflexion: 0 10   Plantar flexion 62 52            Lower Extremity Strength - pt requested that we not test her hip and knee musculature secondary to her fear that it may cause increased pain in  her legs  Right LE  Left LE    Knee extension: NT Knee extension: NT   Knee flexion: NT Knee flexion: NT   Hip flexion: NT Hip flexion: NT   Hip extension:  NT Hip extension: NT   Hip abduction/Glut Medius: NT Hip abduction/Glut medius NT   Hip adduction: NT Hip adduction: NT   Hip internal rotation: NT Hip internal rotation: NT   Hip external rotation: NT Hip external rotation: NT   Ankle dorsiflexion:   4/5 Ankle dorsiflexion:   4+/5   Ankle plantarflexion: 2/5 Ankle plantarflexion: 3/5   Inversion  3/5      3+/5  E#veraion  3/5      3+/5    Special Tests: Knee    Joint Mobility: hypomobile  Palpation:  Pt remains tender to palpation in the distal R achilles tendon as well as in the medial R  Pt received the following Tx for her achilles tendon:  IFC to the R achilles tendon  Soft tissue mobilization to the R calf muscle   Kinesiotape R achilles tendon      Ed pt to use ice PRN 10- 20 min when pain or swelling occurs.     Keiry demo good understanding of the education provided. Patient demo good return demo of skill of exercises.      Assessment  This is a 73 y.o. female referred to outpatient physical therapy and presents with a medical diagnosis of   Encounter Diagnosis   Name Primary?    Achilles tendinitis of right lower extremity     and demonstrates limitations as described in the problem list. Pt will benefit from physcial therapy services in order to maximize pain free and/or functional use of right ankle. The following goals were discussed with the patient and she is in agreement.    Pt remains tender to palpation at the distal attachment of the R achilles tendon as well as along the tendon as a whole.  Problem List: pain, decreased ROM, decreased flexibility, decreased strength, decreased motor control, antalgic gait and decreased ability to fully participate in recreational/sports related activities.      GOALS: Short Term Goals:  4 weeks  1. Pt will demonstrate increased ankle dorsiflexion AROM to 2  degrees.  2. Pt will demonstrate increased ankle plantarflexion AROM to 56 degrees.  3. Independent with HEP to increase patient's tolerance for ambulating    Long Term Goals: 8 weeks  1.Report decreased R achilles and calf pain  <   / =  2  /10 with walkming to increase tolerance for ADL's  2.Increased MMT  for  R LE  to increase tolerance for ADL activities.  3.Increased arom  for  R foot and ankle to improve normal movement patterns during ADL's. - improving  4. Pt will report improvement in overall functional abilities of LE, evidenced by improved score on FOTO survey    Plan  Pt reported that she will be out of town for the next few weeks, but plans to return to continue her Tx's.

## 2018-12-27 NOTE — PROGRESS NOTES
Physical Therapy Daily Note  Visit: 14    Name: Keiry Rudolph  Clinic Number: [id number]    Keiry is a 73 y.o. female treated on 5/24/2018.     Diagnosis:   Encounter Diagnosis   Name Primary?    Achilles tendinitis of right lower extremity        DOS: none    Physician: Donny Interiano MD  Treatment Orders: PT Eval and Treat    No past medical history on file.  No current outpatient medications on file.     No current facility-administered medications for this visit.      Review of patient's allergies indicates:  Allergies not on file  Precautions: Standard  Occupation:       Subjective  Onset/JIMMY: sudden.  Pt woke with pain in her R heel when she got out of bed  Involved Area/Location: right  Current Symptoms: pain    Increases symptoms: walking  Decreases Symptoms: rest    Current Function: Pain in R heel with walking  Previous function: Walking pain free    Diagnostic Tests: NA    Pain Scale: Keiry rates R distal achilles pain to be 9  Patient Goals: decrease pain and ambulate without pain  Pt continues to c/o R heel  Pain.  She also stated that she has new tennis shoes and orthotics, but they are not controlling the pain in her R heel.  She also reported that she has been using celebrex to help manage the pain while she has been out of town.    Objective    Functional Limitations  Reports - G Codes    Category:  Mobility   Tool:  FOTO Outcomes Measurement System.   Score:  Patient scored out 16 of 100 on the FOTO Outcomes Measurement System.   Current:  G 8978 CM   Goal:  G 8979 CK           Observation: Pt with antalgic gait and slight swelling in R ankle    Posture: WNL      Passive Range of Motion: Ankle   Left Right   Dorsiflexion: 0 10   Plantar flexion 62 52            Lower Extremity Strength - pt requested that we not test her hip and knee musculature secondary to her fear that it may cause increased pain in  her legs  Right LE  Left LE    Knee extension: NT Knee extension: NT   Knee flexion: NT Knee flexion: NT   Hip flexion: NT Hip flexion: NT   Hip extension:  NT Hip extension: NT   Hip abduction/Glut Medius: NT Hip abduction/Glut medius NT   Hip adduction: NT Hip adduction: NT   Hip internal rotation: NT Hip internal rotation: NT   Hip external rotation: NT Hip external rotation: NT   Ankle dorsiflexion:   4/5 Ankle dorsiflexion:   4+/5   Ankle plantarflexion: 2/5 Ankle plantarflexion: 3/5   Inversion  3/5      3+/5  E#veraion  3/5      3+/5    Special Tests: Knee    Joint Mobility: hypomobile  Palpation:  Pt remains tender to palpation in the distal R achilles tendon as well as in the medial R  Pt received the following Tx for her achilles tendon:  IFC to the R achilles tendon  Soft tissue mobilization to the R calf muscle   Kinesiotape R achilles tendon      Ed pt to use ice PRN 10- 20 min when pain or swelling occurs.     Keiry demo good understanding of the education provided. Patient demo good return demo of skill of exercises.      Assessment  This is a 73 y.o. female referred to outpatient physical therapy and presents with a medical diagnosis of   Encounter Diagnosis   Name Primary?    Achilles tendinitis of right lower extremity     and demonstrates limitations as described in the problem list. Pt will benefit from physcial therapy services in order to maximize pain free and/or functional use of right ankle. The following goals were discussed with the patient and she is in agreement.    Pt remains tender to palpation at the distal attachment of the R achilles tendon as well as along the tendon as a whole.  Problem List: pain, decreased ROM, decreased flexibility, decreased strength, decreased motor control, antalgic gait and decreased ability to fully participate in recreational/sports related activities.      GOALS: Short Term Goals:  4 weeks  1. Pt will demonstrate increased ankle dorsiflexion AROM to 2  degrees.  2. Pt will demonstrate increased ankle plantarflexion AROM to 56 degrees.  3. Independent with HEP to increase patient's tolerance for ambulating    Long Term Goals: 8 weeks  1.Report decreased R achilles and calf pain  <   / =  2  /10 with walkming to increase tolerance for ADL's  2.Increased MMT  for  R LE  to increase tolerance for ADL activities.  3.Increased arom  for  R foot and ankle to improve normal movement patterns during ADL's. - improving  4. Pt will report improvement in overall functional abilities of LE, evidenced by improved score on FOTO survey    Plan  Pt reported that she will be out of town for the next few weeks, but plans to return to continue her Tx's.

## 2018-12-27 NOTE — PROGRESS NOTES
Physical Therapy DAILY NOTE    Visit: 2    Name: Keiry Rudolph  Clinic Number: [id number]    Keiry is a 73 y.o. female treated on 2/16/2018     Diagnosis:   Encounter Diagnosis   Name Primary?    Achilles tendinitis of right lower extremity        DOS: none    Physician: Donny Interiano MD  Treatment Orders: PT Eval and Treat    No past medical history on file.  No current outpatient medications on file.     No current facility-administered medications for this visit.      Review of patient's allergies indicates:  Allergies not on file  Precautions: Standard  Occupation:       Subjective  Onset/JIMMY: sudden.  Pt woke with pain in her R heel when she got out of bed  Involved Area/Location: right  Current Symptoms: pain    Increases symptoms: walking  Decreases Symptoms: rest    Current Function: Pain in R heel with walking  Previous function: Walking pain free    Diagnostic Tests: NA    Pain Scale: Keiry rates R distal achilles pain to be 9  Patient Goals: decrease pain and ambulate without pain      Objective    Functional Limitations  Reports - G Codes    Category:  Mobility   Tool:  FOTO Outcomes Measurement System.   Score:  Patient scored out 10 of 100 on the FOTO Outcomes Measurement System.   Current:  G 8978 CM   Goal:  G 8979 CK           Observation: Pt with antalgic gait and mild swelling in R ankle    Posture: WNL      Passive Range of Motion: Ankle   Left Right   Dorsiflexion: NT 28   Plantar flexion NT 52            Lower Extremity Strength  Right LE  Left LE    Knee extension: NT Knee extension: NT   Knee flexion: NT Knee flexion: NT   Hip flexion: NT Hip flexion: NT   Hip extension:  NT Hip extension: NT   Hip abduction/Glut Medius: NT Hip abduction/Glut medius NT   Hip adduction: NT Hip adduction: NT   Hip internal rotation: NT Hip internal rotation: NT   Hip external rotation: NT Hip external rotation: NT    Ankle dorsiflexion:   4/5 Ankle dorsiflexion:   4+/5   Ankle plantarflexion: NT Ankle plantarflexion: NT       Special Tests: Knee    Joint Mobility: hypomobile  Palpation:  Pt is TTP in the R achilles and toe flexor tendons as well as the general calf muscle on the R  Sensation: intact to light touch    Edema: min R ankle      Gait: Keiry ambulated 200 feet with no assistive device and decreased toe off on the R    Balance: NT    PT Evaluation Completed? Yes  Discussed Plan of Care with patient: Yes    Treatment:  Pt performed there ex's for R lower leg strengthening, ROM, flexibility and endurance including:  MH with pre-modulated ES to the R achilles tendon    Exercises were reviewed and pt was able to demonstrate them prior to the end of the session.     Ed pt to use ice PRN 10- 20 min when pain or swelling occurs.     Keiry demo good understanding of the education provided. Patient demo good return demo of skill of exercises.      Assessment  This is a 73 y.o. female referred to outpatient physical therapy and presents with a medical diagnosis of   Encounter Diagnosis   Name Primary?    Achilles tendinitis of right lower extremity     and demonstrates limitations as described in the problem list. Pt will benefit from physcial therapy services in order to maximize pain free and/or functional use of right ankle. The following goals were discussed with the patient and patient is in agreement with them as to be addressed in the treatment plan.     Problem List: pain, decreased ROM, decreased flexibility, decreased strength, decreased motor control, antalgic gait and decreased ability to fully participate in recreational/sports related activities.      GOALS: Short Term Goals:  4 weeks  1. Pt will demonstrate increased ankle dorsiflexion AROM to 20 degrees.  2. Pt will demonstrate increased ankle plantarflexion AROM to 56 degrees.  3. Independent with HEP to increase patient's tolerance for ambulating    Long  Term Goals: 8 weeks  1.Report decreased R achilles and calf pain  <   / =  2  /10 with walkming to increase tolerance for ADL's  2.Increased MMT  for  R LE  to increase tolerance for ADL activities.  3.Increased arom  for  R foot and ankle to improve normal movement patterns during ADL's.  4. Pt will report improvement in overall functional abilities of LE, evidenced by improved score on FOTO survey    Plan  Pt will be treated by physical therapy 2-3 times a week for 8 weeks for Pt Education, HEP, therapeutic exercises, neuromuscular re-education/ balance exercises, joint mobilizations, modalities prn   to achieve established goals. Keiry may at times be seen by a PTA as part of the Rehab Team.     Cont PT for  8         weeks.

## 2018-12-27 NOTE — PROGRESS NOTES
Physical Therapy Evaluation    Visit: 2    Name: Keiry Rudolph  Clinic Number: [id number]    Keiry is a 73 y.o. female evaluated on 2/20/2018.     Diagnosis:   Encounter Diagnosis   Name Primary?    Achilles tendinitis of right lower extremity        DOS: none    Physician: Donny Interiano MD  Treatment Orders: PT Eval and Treat    No past medical history on file.  No current outpatient medications on file.     No current facility-administered medications for this visit.      Review of patient's allergies indicates:  Allergies not on file  Precautions: Standard  Occupation:       Subjective  Onset/JIMMY: sudden.  Pt woke with pain in her R heel when she got out of bed  Involved Area/Location: right  Current Symptoms: pain    Increases symptoms: walking  Decreases Symptoms: rest    Current Function: Pain in R heel with walking  Previous function: Walking pain free    Diagnostic Tests: NA    Pain Scale: Keiry rates R distal achilles pain to be 9  Patient Goals: decrease pain and ambulate without pain      Objective    Functional Limitations  Reports - G Codes    Category:  Mobility   Tool:  FOTO Outcomes Measurement System.   Score:  Patient scored out 10 of 100 on the FOTO Outcomes Measurement System.   Current:  G 8978 CM   Goal:  G 8979 CK           Observation: Pt with antalgic gait and mild swelling in R ankle    Posture: WNL      Passive Range of Motion: Ankle   Left Right   Dorsiflexion: NT 28   Plantar flexion NT 52            Lower Extremity Strength  Right LE  Left LE    Knee extension: NT Knee extension: NT   Knee flexion: NT Knee flexion: NT   Hip flexion: NT Hip flexion: NT   Hip extension:  NT Hip extension: NT   Hip abduction/Glut Medius: NT Hip abduction/Glut medius NT   Hip adduction: NT Hip adduction: NT   Hip internal rotation: NT Hip internal rotation: NT   Hip external rotation: NT Hip external rotation:  NT   Ankle dorsiflexion:   4/5 Ankle dorsiflexion:   4+/5   Ankle plantarflexion: NT Ankle plantarflexion: NT       Special Tests: Knee    Joint Mobility: hypomobile  Palpation:  Pt is TTP in the R achilles and toe flexor tendons as well as the general calf muscle on the R  Sensation: intact to light touch    Edema: min R ankle      Gait: Keiry ambulated 200 feet with no assistive device and decreased toe off on the R    Balance: NT    Treatment:  Pt performed there ex's for R lower leg strengthening, ROM, flexibility and endurance including:  MH with pre-modulated ES to the R achilles tendon    Exercises were reviewed and pt was able to demonstrate them prior to the end of the session.     Ed pt to use ice PRN 10- 20 min when pain or swelling occurs.     Keiry demo good understanding of the education provided. Patient demo good return demo of skill of exercises.      Assessment  This is a 73 y.o. female referred to outpatient physical therapy and presents with a medical diagnosis of   Encounter Diagnosis   Name Primary?    Achilles tendinitis of right lower extremity     and demonstrates limitations as described in the problem list. Pt will benefit from physcial therapy services in order to maximize pain free and/or functional use of right ankle. The following goals were discussed with the patient and patient is in agreement with them as to be addressed in the treatment plan.     Problem List: pain, decreased ROM, decreased flexibility, decreased strength, decreased motor control, antalgic gait and decreased ability to fully participate in recreational/sports related activities.      GOALS: Short Term Goals:  4 weeks  1. Pt will demonstrate increased ankle dorsiflexion AROM to 20 degrees.  2. Pt will demonstrate increased ankle plantarflexion AROM to 56 degrees.  3. Independent with HEP to increase patient's tolerance for ambulating    Long Term Goals: 8 weeks  1.Report decreased R achilles and calf pain  <   /  =  2  /10 with walkming to increase tolerance for ADL's  2.Increased MMT  for  R LE  to increase tolerance for ADL activities.  3.Increased arom  for  R foot and ankle to improve normal movement patterns during ADL's.  4. Pt will report improvement in overall functional abilities of LE, evidenced by improved score on FOTO survey    Plan  Pt will be treated by physical therapy 2-3 times a week for 8 weeks for Pt Education, HEP, therapeutic exercises, neuromuscular re-education/ balance exercises, joint mobilizations, modalities prn   to achieve established goals. Keiry may at times be seen by a PTA as part of the Rehab Team.     Cont PT for  8         weeks.

## 2018-12-27 NOTE — PROGRESS NOTES
Physical Therapy Daily Note  Visit: 15    Name: Keiry Rudolph  Clinic Number: [id number]    Keiry is a 73 y.o. female treated on 5/30/2018.     Diagnosis:   Encounter Diagnosis   Name Primary?    Achilles tendinitis of right lower extremity        DOS: none    Physician: Donny Interiano MD  Treatment Orders: PT Eval and Treat    No past medical history on file.  No current outpatient medications on file.     No current facility-administered medications for this visit.      Review of patient's allergies indicates:  Allergies not on file  Precautions: Standard  Occupation:       Subjective  Onset/JIMMY: sudden.  Pt woke with pain in her R heel when she got out of bed  Involved Area/Location: right  Current Symptoms: pain    Increases symptoms: walking  Decreases Symptoms: rest    Current Function: Pain in R heel with walking  Previous function: Walking pain free    Diagnostic Tests: NA    Pain Scale: Keiry rates R distal achilles pain to be 9  Patient Goals: decrease pain and ambulate without pain  Pt continues to c/o R heel  Pain.  She also stated that she has new tennis shoes and orthotics, but they are not controlling the pain in her R heel.  She also reported that she has been using celebrex to help manage the pain while she has been out of town.    Objective    Functional Limitations  Reports - G Codes    Category:  Mobility   Tool:  FOTO Outcomes Measurement System.   Score:  Patient scored out 16 of 100 on the FOTO Outcomes Measurement System.   Current:  G 8978 CM   Goal:  G 8979 CK           Observation: Pt with antalgic gait and slight swelling in R ankle    Posture: WNL      Passive Range of Motion: Ankle   Left Right   Dorsiflexion: 0 10   Plantar flexion 62 52            Lower Extremity Strength - pt requested that we not test her hip and knee musculature secondary to her fear that it may cause increased pain in  her legs  Right LE  Left LE    Knee extension: NT Knee extension: NT   Knee flexion: NT Knee flexion: NT   Hip flexion: NT Hip flexion: NT   Hip extension:  NT Hip extension: NT   Hip abduction/Glut Medius: NT Hip abduction/Glut medius NT   Hip adduction: NT Hip adduction: NT   Hip internal rotation: NT Hip internal rotation: NT   Hip external rotation: NT Hip external rotation: NT   Ankle dorsiflexion:   4/5 Ankle dorsiflexion:   4+/5   Ankle plantarflexion: 2/5 Ankle plantarflexion: 3/5   Inversion  3/5      3+/5  E#veraion  3/5      3+/5    Special Tests: Knee    Joint Mobility: hypomobile  Palpation:  Pt remains tender to palpation in the distal R achilles tendon as well as in the medial R  Pt received the following Tx for her achilles tendon:  IFC to the R achilles tendon  Soft tissue mobilization to the R calf muscle   Kinesiotape R achilles tendon      Ed pt to use ice PRN 10- 20 min when pain or swelling occurs.     Keiry demo good understanding of the education provided. Patient demo good return demo of skill of exercises.      Assessment  This is a 73 y.o. female referred to outpatient physical therapy and presents with a medical diagnosis of   Encounter Diagnosis   Name Primary?    Achilles tendinitis of right lower extremity     and demonstrates limitations as described in the problem list. Pt will benefit from physcial therapy services in order to maximize pain free and/or functional use of right ankle. The following goals were discussed with the patient and she is in agreement.    Pt remains tender to palpation at the distal attachment of the R achilles tendon as well as along the tendon as a whole.  Problem List: pain, decreased ROM, decreased flexibility, decreased strength, decreased motor control, antalgic gait and decreased ability to fully participate in recreational/sports related activities.      GOALS: Short Term Goals:  4 weeks  1. Pt will demonstrate increased ankle dorsiflexion AROM to 2  degrees.  2. Pt will demonstrate increased ankle plantarflexion AROM to 56 degrees.  3. Independent with HEP to increase patient's tolerance for ambulating    Long Term Goals: 8 weeks  1.Report decreased R achilles and calf pain  <   / =  2  /10 with walkming to increase tolerance for ADL's  2.Increased MMT  for  R LE  to increase tolerance for ADL activities.  3.Increased arom  for  R foot and ankle to improve normal movement patterns during ADL's. - improving  4. Pt will report improvement in overall functional abilities of LE, evidenced by improved score on FOTO survey    Plan  Pt reported that she will be out of town for the next few weeks, but plans to return to continue her Tx's.

## 2024-11-05 NOTE — PROGRESS NOTES
Physical Therapy Daily Note    Visit: 7    Name: Keiry Rudolph  Clinic Number: [id number]    Keiry is a 71 y.o. female treated on 05/23/2017.     Diagnosis:   No diagnosis found.    DOS: NA    Physician: Josemanuel Duggan MD  Treatment Orders: PT Eval and Treat    No past medical history on file.  No current outpatient prescriptions on file.     No current facility-administered medications for this visit.      Review of patient's allergies indicates:  Allergies not on file  Precautions: Fall  Occupation: retired      Subjective  Onset/JIMMY: gradual  Involved Area/Location: bilateral  Current Symptoms: pain    Increases symptoms: Movement  Decreases Symptoms: Not moving    Current Function: Prolonged walking, standing and climbing stairs  Previous function: I in ADL's    Diagnostic Tests: MRI    Pain Scale: Keiry rates B leg pain to be between 4 - 8 on a scale of 1-10.    Patient Goals: decrease pain and Improve mobility and walk without pain  Pt reported that prolonged walking increases her pain from the waist down.    Objective    Functional Limitations  Reports - G Codes    Category:  Mobility   Tool:  FOTO Outcomes Measurement System.   Score:  Patient scored out 24 of 100 on the FOTO Outcomes Measurement System.   Current:  G 8978 CL   Goal:  G 8979 CK          Observation: Pt was not willing flex her L hip in supine    Posture: Flexed trunk in standing       Passive Range of Motion: Knee   Left Right   Flexion: NT NT   Extension 0 0            Lower Extremity Strength  Right LE  Left LE    Knee extension: 3+/5 Knee extension: 3/5   Knee flexion: 2/5 Knee flexion: 2/5   Hip flexion: 2/5 Hip flexion: 2/5   Hip extension:  NT Hip extension: NT   Hip abduction/Glut Medius: NT Hip abduction/Glut medius NT   Hip adduction: NT Hip adduction: NT   Hip internal rotation: NT Hip internal rotation: NT   Hip external rotation: NT Hip  ----- Message from Cortney Wolff sent at 10/31/2024  9:05 AM EDT -----  Please call patient about their normal result.    Your coronary artery CT was normal.  There is no evidence of any plaquing stenosis or occlusion in any of your coronary arteries.  You should speak with your primary care provider regarding your symptoms and consider other causes for your chest pain.    I would recommend that you have your cholesterol checked and start a statin if necessary.  You do not need to be taking aspirin.    Thanks, Cortney   external rotation: NT   Ankle dorsiflexion: 5/5 Ankle dorsiflexion: 4/5   Ankle plantarflexion: NT Ankle plantarflexion: NT       Special Tests: Knee - NA    Joint Mobility: WNL  Palpation: B hip Adductors and hip flexors  Sensation: intact to light touch    Edema: none      Gait: Keiry ambulated 100 feet with no assistive device.    Balance: NT    Treatment:  Pt performed there ex's for B LE strengthening, ROM, flexibility and endurance including:  Nu Step x 10 minutes - set up for Pt  UBE x 8 minutes  B Quad sets 10 x 3 with 3 sec hold  Gluteal sets 10 x 3 with 3 sec holld  B Ankle Dorsiflexion, Plantar flexion, inversion and eversion 10 x 3 each  Rolling R & L quadriceps and glut med.  Clams 10 x 3 on R & L  Supine B hip rotation 10 x 3  SAQ 20 x 3 on R & L   Soft tissue rolling over B anterior thighs  Exercises were reviewed and pt was able to demonstrate them prior to the end of the session.      Treatments not performed today:  Heel slides 10 x 0 on the L and 10 x 3 on the R - with slide board and a towel set up for Pt.  Ed pt to use ice PRN 10- 20 min when pain or swelling occurs.     Keiry demo good understanding of the education provided. Patient demo good return demo of skill of exercises.      Assessment  This is a 71 y.o. female referred to outpatient physical therapy and presents with a medical diagnosis of   B hip pain and demonstrates limitations as described in the problem list. Pt will benefit from physcial therapy services in order to maximize pain free and/or functional use of bilateral knees and hips. The following goals were discussed with the patient and patient is in agreement with them as to be addressed in the treatment plan.  Pt tolerated exercises well today.    Problem List: pain, decreased strength, decreased balance and stability and decreased ability to fully participate in recreational/sports related activities.      GOALS: Short Term Goals:  3 weeks  1. Pt will demonstrate  increased knee flexion AROM to 120 degrees.  2. Pt will demonstrate increased hip flexion AROM to 90 degrees  3. Independent with HEP to increase patient's tolerance for ADL  function    Long Term Goals: 6 weeks  1.Report decreased    B LE    pain  <   / =  2  /10 with walking to increase tolerance for ADL's  2.Increased MMT  for  B LE's  to increase tolerance for ADL and work activities.  3.Increased arom  for  B hips and knees to improve normal movement patterns during ADL's.  4. Pt will report improvement in overall functional abilities of LE, evidenced by improved score on the FOTO knee survey    Plan  Pt will be treated by physical therapy 2 times a week for 6 weeks for Pt Education, HEP, therapeutic exercises, neuromuscular re-education/ balance exercises, joint mobilizations, modalities prn   to achieve established goals. Keiry may at times be seen by a PTA as part of the Rehab Team.     Cont PT for  6         weeks.